# Patient Record
Sex: FEMALE | Race: WHITE | NOT HISPANIC OR LATINO | Employment: FULL TIME | URBAN - METROPOLITAN AREA
[De-identification: names, ages, dates, MRNs, and addresses within clinical notes are randomized per-mention and may not be internally consistent; named-entity substitution may affect disease eponyms.]

---

## 2017-07-11 ENCOUNTER — OFFICE VISIT (OUTPATIENT)
Dept: INTERNAL MEDICINE | Facility: CLINIC | Age: 42
End: 2017-07-11

## 2017-07-11 VITALS
SYSTOLIC BLOOD PRESSURE: 122 MMHG | TEMPERATURE: 99.2 F | DIASTOLIC BLOOD PRESSURE: 70 MMHG | HEART RATE: 75 BPM | OXYGEN SATURATION: 98 % | HEIGHT: 65 IN | BODY MASS INDEX: 48.82 KG/M2 | WEIGHT: 293 LBS

## 2017-07-11 DIAGNOSIS — Z79.899 ENCOUNTER FOR LONG-TERM (CURRENT) USE OF MEDICATIONS: ICD-10-CM

## 2017-07-11 DIAGNOSIS — R10.13 EPIGASTRIC PAIN: Primary | ICD-10-CM

## 2017-07-11 DIAGNOSIS — K92.0 HEMATEMESIS WITH NAUSEA: ICD-10-CM

## 2017-07-11 LAB
ALBUMIN SERPL-MCNC: 4.2 G/DL (ref 3.2–4.8)
ALBUMIN/GLOB SERPL: 2.1 G/DL (ref 1.5–2.5)
ALP SERPL-CCNC: 84 U/L (ref 25–100)
ALT SERPL W P-5'-P-CCNC: 14 U/L (ref 7–40)
AMYLASE SERPL-CCNC: 35 U/L (ref 30–118)
ANION GAP SERPL CALCULATED.3IONS-SCNC: 0 MMOL/L (ref 3–11)
AST SERPL-CCNC: 13 U/L (ref 0–33)
BASOPHILS # BLD AUTO: 0.05 10*3/MM3 (ref 0–0.2)
BASOPHILS NFR BLD AUTO: 0.5 % (ref 0–1)
BILIRUB SERPL-MCNC: 0.7 MG/DL (ref 0.3–1.2)
BUN BLD-MCNC: 12 MG/DL (ref 9–23)
BUN/CREAT SERPL: 20 (ref 7–25)
CALCIUM SPEC-SCNC: 9.7 MG/DL (ref 8.7–10.4)
CHLORIDE SERPL-SCNC: 108 MMOL/L (ref 99–109)
CO2 SERPL-SCNC: 30 MMOL/L (ref 20–31)
CREAT BLD-MCNC: 0.6 MG/DL (ref 0.6–1.3)
DEPRECATED RDW RBC AUTO: 43.8 FL (ref 37–54)
EOSINOPHIL # BLD AUTO: 0.49 10*3/MM3 (ref 0–0.3)
EOSINOPHIL NFR BLD AUTO: 4.8 % (ref 0–3)
ERYTHROCYTE [DISTWIDTH] IN BLOOD BY AUTOMATED COUNT: 13.3 % (ref 11.3–14.5)
GFR SERPL CREATININE-BSD FRML MDRD: 111 ML/MIN/1.73
GLOBULIN UR ELPH-MCNC: 2 GM/DL
GLUCOSE BLD-MCNC: 82 MG/DL (ref 70–100)
HCT VFR BLD AUTO: 47.6 % (ref 34.5–44)
HGB BLD-MCNC: 15.2 G/DL (ref 11.5–15.5)
IMM GRANULOCYTES # BLD: 0.08 10*3/MM3 (ref 0–0.03)
IMM GRANULOCYTES NFR BLD: 0.8 % (ref 0–0.6)
LIPASE SERPL-CCNC: 31 U/L (ref 6–51)
LITHIUM SERPL-SCNC: 0.6 MMOL/L (ref 0.6–1.2)
LYMPHOCYTES # BLD AUTO: 2.02 10*3/MM3 (ref 0.6–4.8)
LYMPHOCYTES NFR BLD AUTO: 19.8 % (ref 24–44)
MCH RBC QN AUTO: 29.2 PG (ref 27–31)
MCHC RBC AUTO-ENTMCNC: 31.9 G/DL (ref 32–36)
MCV RBC AUTO: 91.5 FL (ref 80–99)
MONOCYTES # BLD AUTO: 0.5 10*3/MM3 (ref 0–1)
MONOCYTES NFR BLD AUTO: 4.9 % (ref 0–12)
NEUTROPHILS # BLD AUTO: 7.08 10*3/MM3 (ref 1.5–8.3)
NEUTROPHILS NFR BLD AUTO: 69.2 % (ref 41–71)
PLATELET # BLD AUTO: 228 10*3/MM3 (ref 150–450)
PMV BLD AUTO: 11.6 FL (ref 6–12)
POTASSIUM BLD-SCNC: 4.7 MMOL/L (ref 3.5–5.5)
PROT SERPL-MCNC: 6.2 G/DL (ref 5.7–8.2)
RBC # BLD AUTO: 5.2 10*6/MM3 (ref 3.89–5.14)
SODIUM BLD-SCNC: 138 MMOL/L (ref 132–146)
WBC NRBC COR # BLD: 10.22 10*3/MM3 (ref 3.5–10.8)

## 2017-07-11 PROCEDURE — 83013 H PYLORI (C-13) BREATH: CPT | Performed by: NURSE PRACTITIONER

## 2017-07-11 PROCEDURE — 80053 COMPREHEN METABOLIC PANEL: CPT | Performed by: NURSE PRACTITIONER

## 2017-07-11 PROCEDURE — 82150 ASSAY OF AMYLASE: CPT | Performed by: NURSE PRACTITIONER

## 2017-07-11 PROCEDURE — 99214 OFFICE O/P EST MOD 30 MIN: CPT | Performed by: NURSE PRACTITIONER

## 2017-07-11 PROCEDURE — 80178 ASSAY OF LITHIUM: CPT | Performed by: NURSE PRACTITIONER

## 2017-07-11 PROCEDURE — 83690 ASSAY OF LIPASE: CPT | Performed by: NURSE PRACTITIONER

## 2017-07-11 PROCEDURE — 85025 COMPLETE CBC W/AUTO DIFF WBC: CPT | Performed by: NURSE PRACTITIONER

## 2017-07-11 RX ORDER — OMEPRAZOLE 20 MG/1
20 CAPSULE, DELAYED RELEASE ORAL 2 TIMES DAILY
Qty: 60 CAPSULE | Refills: 2 | Status: SHIPPED | OUTPATIENT
Start: 2017-07-11 | End: 2017-07-14 | Stop reason: SDUPTHER

## 2017-07-11 RX ORDER — ONDANSETRON 4 MG/1
4 TABLET, FILM COATED ORAL EVERY 8 HOURS PRN
Qty: 60 TABLET | Refills: 0 | Status: SHIPPED | OUTPATIENT
Start: 2017-07-11 | End: 2017-08-10

## 2017-07-11 NOTE — PROGRESS NOTES
Subjective   Raine Wang is a 41 y.o. female    Chief Complaint   Patient presents with   • Abdominal Pain     Has been going on for about 2 months.   • Nausea   • Diarrhea   • Vomiting     Abdominal Pain   This is a recurrent problem. Episode onset: 2 months. The onset quality is undetermined. The problem occurs intermittently. The problem has been waxing and waning. The pain is located in the epigastric region. The pain is moderate. The quality of the pain is aching and cramping. The abdominal pain does not radiate. Associated symptoms include diarrhea, nausea and vomiting. Pertinent negatives include no arthralgias, belching, constipation, dysuria, fever, flatus, frequency, headaches, hematochezia, hematuria, melena, myalgias or weight loss. The pain is aggravated by eating (stress). The pain is relieved by nothing. Treatments tried: biaxin, prevacid and protonix. The treatment provided mild relief. There is no history of abdominal surgery, colon cancer, Crohn's disease, gallstones, GERD, irritable bowel syndrome, pancreatitis, PUD or ulcerative colitis.   Nausea   Associated symptoms include abdominal pain (epigastric), nausea and vomiting. Pertinent negatives include no arthralgias, chest pain, chills, coughing, fatigue, fever, headaches or myalgias.   Diarrhea    Associated symptoms include abdominal pain (epigastric) and vomiting. Pertinent negatives include no arthralgias, chills, coughing, fever, headaches, increased  flatus, myalgias or weight loss. There is no history of irritable bowel syndrome.   Vomiting    Associated symptoms include abdominal pain (epigastric) and diarrhea. Pertinent negatives include no arthralgias, chest pain, chills, coughing, dizziness, fever, headaches, myalgias or weight loss. Associated symptoms comments: Blood-tinged.      Was seen in the UC about 6 weeks ago and treated with Protonix.  Sx's did not improve, so she went back.  They tried to send her to the ER, but she  did not go.  She had a friend send her in Biaxin and Prevacid due to concerns of H. Pylori.  Sx's slightly improved, but quickly returned.  She has had a few bouts of blood-tinged emesis.    She requests a lithium level to be drawn today with labs.    The following portions of the patient's history were reviewed and updated as appropriate: allergies, current medications, past family history, past medical history, past social history, past surgical history and problem list.    Current Outpatient Prescriptions:   •  albuterol (PROVENTIL HFA;VENTOLIN HFA) 108 (90 BASE) MCG/ACT inhaler, Inhale 2 puffs every 4 (four) hours as needed for wheezing., Disp: 1 inhaler, Rfl: 11  •  benzonatate (TESSALON) 200 MG capsule, Take 1 capsule by mouth 3 (Three) Times a Day As Needed for cough for up to 15 doses., Disp: 15 capsule, Rfl: 0  •  fexofenadine (ALLEGRA) 180 MG tablet, Take 180 mg by mouth daily., Disp: , Rfl:   •  HYDROcod Polst-CPM Polst ER (TUSSIONEX PENNKINETIC ER) 10-8 MG/5ML ER suspension, Take 5 mL by mouth Every 12 (Twelve) Hours As Needed for cough., Disp: 115 mL, Rfl: 0  •  lamoTRIgine (LaMICtal) 100 MG tablet, Take 200 mg by mouth daily., Disp: , Rfl:   •  lithium carbonate 300 MG capsule, Take 300 mg by mouth daily., Disp: , Rfl:   •  omeprazole (priLOSEC) 20 MG capsule, Take 1 capsule by mouth 2 (Two) Times a Day., Disp: 60 capsule, Rfl: 2  •  ondansetron (ZOFRAN) 4 MG tablet, Take 1 tablet by mouth Every 8 (Eight) Hours As Needed for Nausea or Vomiting., Disp: 60 tablet, Rfl: 0  •  pseudoephedrine (SUDAFED) 120 MG 12 hr tablet, Take 1 tablet by mouth every 12 (twelve) hours., Disp: 60 tablet, Rfl: 5     Review of Systems   Constitutional: Negative for chills, fatigue, fever and weight loss.   Respiratory: Negative for cough, chest tightness and shortness of breath.    Cardiovascular: Negative for chest pain.   Gastrointestinal: Positive for abdominal pain (epigastric), diarrhea, nausea and vomiting. Negative  "for abdominal distention, anal bleeding, blood in stool, constipation, flatus, hematochezia, melena and rectal pain.   Endocrine: Negative for cold intolerance and heat intolerance.   Genitourinary: Negative for dysuria, frequency and hematuria.   Musculoskeletal: Negative for arthralgias and myalgias.   Neurological: Negative for dizziness and headaches.       Objective   Physical Exam   Constitutional: She is oriented to person, place, and time. She appears well-developed and well-nourished.   HENT:   Head: Normocephalic and atraumatic.   Eyes: Conjunctivae and EOM are normal. Pupils are equal, round, and reactive to light.   Neck: Normal range of motion.   Cardiovascular: Normal rate, regular rhythm and normal heart sounds.    Pulmonary/Chest: Effort normal and breath sounds normal.   Abdominal: Soft. Bowel sounds are normal. She exhibits no distension and no mass. There is no tenderness. There is no rebound and no guarding. No hernia.   Musculoskeletal: Normal range of motion.   Neurological: She is alert and oriented to person, place, and time. She has normal reflexes.   Skin: Skin is warm and dry.   Psychiatric: She has a normal mood and affect. Her behavior is normal. Judgment and thought content normal.     Vitals:    07/11/17 1008   BP: 122/70   BP Location: Right arm   Pulse: 75   Temp: 99.2 °F (37.3 °C)   TempSrc: Temporal Artery    SpO2: 98%   Weight: (!) 319 lb 9.6 oz (145 kg)   Height: 65\" (165.1 cm)         Assessment/Plan   Raine was seen today for abdominal pain, nausea, diarrhea and vomiting.    Diagnoses and all orders for this visit:    Epigastric pain  -     omeprazole (priLOSEC) 20 MG capsule; Take 1 capsule by mouth 2 (Two) Times a Day.  -     ondansetron (ZOFRAN) 4 MG tablet; Take 1 tablet by mouth Every 8 (Eight) Hours As Needed for Nausea or Vomiting.  -     Amylase  -     Lipase  -     CBC & Differential  -     Comprehensive Metabolic Panel  -     H. Pylori Breath Test  -     CBC Auto " Differential    Hematemesis with nausea  -     omeprazole (priLOSEC) 20 MG capsule; Take 1 capsule by mouth 2 (Two) Times a Day.  -     ondansetron (ZOFRAN) 4 MG tablet; Take 1 tablet by mouth Every 8 (Eight) Hours As Needed for Nausea or Vomiting.  -     Amylase  -     Lipase  -     CBC & Differential  -     Comprehensive Metabolic Panel  -     H. Pylori Breath Test  -     CBC Auto Differential    Encounter for long-term (current) use of medications  -     Lithium level    We will ck labs and an H. Pylori breath test today  Omeprazole BID, Zofran PRN for nausea  [place] diet, avoid caffeine  If labs and breath test is normal, she will need a GB US and possibly a scope.   F/U in 4 weeks or sooner with any problems

## 2017-07-13 LAB — UREA BREATH TEST QL: POSITIVE

## 2017-07-14 ENCOUNTER — TELEPHONE (OUTPATIENT)
Dept: INTERNAL MEDICINE | Facility: CLINIC | Age: 42
End: 2017-07-14

## 2017-07-14 DIAGNOSIS — R10.13 EPIGASTRIC PAIN: ICD-10-CM

## 2017-07-14 DIAGNOSIS — K92.0 HEMATEMESIS WITH NAUSEA: ICD-10-CM

## 2017-07-14 RX ORDER — CLARITHROMYCIN 500 MG/1
500 TABLET, COATED ORAL 2 TIMES DAILY
Qty: 20 TABLET | Refills: 0 | Status: SHIPPED | OUTPATIENT
Start: 2017-07-14 | End: 2017-07-24

## 2017-07-14 RX ORDER — OMEPRAZOLE 20 MG/1
20 CAPSULE, DELAYED RELEASE ORAL 2 TIMES DAILY
Qty: 20 CAPSULE | Refills: 0 | Status: SHIPPED | OUTPATIENT
Start: 2017-07-14 | End: 2017-07-24

## 2017-07-14 RX ORDER — AMOXICILLIN 500 MG/1
1000 CAPSULE ORAL 2 TIMES DAILY
Qty: 20 CAPSULE | Refills: 0 | Status: SHIPPED | OUTPATIENT
Start: 2017-07-14 | End: 2017-07-24

## 2017-07-14 NOTE — TELEPHONE ENCOUNTER
----- Message from JOSEPH Teixeira sent at 7/14/2017  8:34 AM EDT -----  H. Pylori breath test was +.  I will send in meds to treat.  She will need an EGD if sx's are not improving after treatment.

## 2017-07-14 NOTE — TELEPHONE ENCOUNTER
Patient has been notified!     I have clarified the the script to the pharmacist about the amoxicillin qty which should be 40.

## 2017-07-14 NOTE — TELEPHONE ENCOUNTER
[address] called to get clarification on the rx that was sent over for this patient this morning. Thanks

## 2017-07-17 ENCOUNTER — TELEPHONE (OUTPATIENT)
Dept: INTERNAL MEDICINE | Facility: CLINIC | Age: 42
End: 2017-07-17

## 2017-07-17 NOTE — TELEPHONE ENCOUNTER
PT DOES NOT HAVE A RASH BUT ITCHY ALL OVER WENDY PRESCRIBED 2 ANTIBIOTICS SHE HAS TAKEN BOTH SEPERATLY BUT NOT TOGETHER PLEASE GIVE HER A CALL.

## 2017-07-17 NOTE — TELEPHONE ENCOUNTER
I spoke with Salma and let her know that she can try taking benadryl to see if this will help with her itching. She said she took some last night and it does help with the itching. I told her that She can continue with the benadryl as directed if it is helping, but to call us and let us know if she starts to develop any type of rash or it starts to get worse. I told her that Dr. Blanton would then change her to something else. She said she will continue with everything right now since the benadryl does help!

## 2017-08-08 ENCOUNTER — OFFICE VISIT (OUTPATIENT)
Dept: INTERNAL MEDICINE | Facility: CLINIC | Age: 42
End: 2017-08-08

## 2017-08-08 VITALS
WEIGHT: 293 LBS | OXYGEN SATURATION: 97 % | BODY MASS INDEX: 48.82 KG/M2 | HEART RATE: 86 BPM | SYSTOLIC BLOOD PRESSURE: 118 MMHG | RESPIRATION RATE: 20 BRPM | DIASTOLIC BLOOD PRESSURE: 68 MMHG | TEMPERATURE: 97.6 F | HEIGHT: 65 IN
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
## 2017-08-08 DIAGNOSIS — S09.90XA HEAD INJURY, INITIAL ENCOUNTER: ICD-10-CM

## 2017-08-08 DIAGNOSIS — M54.2 NECK PAIN, ACUTE: ICD-10-CM

## 2017-08-08 PROCEDURE — 99213 OFFICE O/P EST LOW 20 MIN: CPT | Performed by: NURSE PRACTITIONER

## 2017-08-08 NOTE — PROGRESS NOTES
Subjective   Raine Wang is a 41 y.o. female.   Chief Complaint   Patient presents with   • Head Injury     Fell yesterday at the [employer] fell straight back walking on a wooden ramp.  Hit back part of her head and blacked out for a few seconds.  When she came to herself again she was dizzy and eyes was out of focus.  Was seeing light flashes for the first 10 minutes.  Today she has been disoriented, trying to gather her thoughts and really has to think about what she is going to say.  Still really sore.  No knot but her neck is really sore and the back of her head and her left eye keeps twitching.         Head Injury    The incident occurred 12 to 24 hours ago (She slipped on wet ramp at [employer] and hit the back of her head on the wooden floor.). The injury mechanism was a fall. She lost consciousness for a period of less than one minute (10 seconds). There was no blood loss. The pain is moderate. The pain has been fluctuating since the injury. Associated symptoms include blurred vision, disorientation, headaches and memory loss (having trouble remembering names today.). Pertinent negatives include no numbness, tinnitus, vomiting or weakness. Associated symptoms comments: She lost conciousness for about 10 seconds, vision was fuzzy, saw light flashes for 10 minutes, and then was able to finish the [email or web address] tour and drive back home.  When she got home, she felt really bad with a headache and her left eye began twitching.  Today she still feeling fuzzy headed, feels disoriented, and is having to think before speaking and trouble finding her words. She denies numbness tingling, extremity weakness. She is having some nausea and is also having a lot on left sided neck pain since the fall.  She took tylenol and ibuprofen with mild relief. Neck feels tight and is spasming. . She has tried NSAIDs and acetaminophen for the symptoms. The treatment provided mild relief.              The following portions of the  patient's history were reviewed and updated as appropriate: allergies, current medications, past family history, past medical history, past social history, past surgical history and problem list.    Review of Systems   Constitutional: Negative for activity change, appetite change, chills, fatigue and fever.   HENT: Negative for postnasal drip, rhinorrhea and tinnitus.    Eyes: Positive for blurred vision and visual disturbance. Negative for photophobia, pain and redness.   Respiratory: Negative for cough and shortness of breath.    Cardiovascular: Negative for chest pain.   Gastrointestinal: Positive for nausea. Negative for vomiting.   Musculoskeletal: Positive for neck pain and neck stiffness. Negative for back pain.   Skin: Negative for rash and wound.   Neurological: Positive for dizziness and headaches. Negative for tremors, seizures, syncope, speech difficulty, weakness and numbness.   Hematological: Does not bruise/bleed easily.   Psychiatric/Behavioral: Positive for decreased concentration and memory loss (having trouble remembering names today.). Negative for agitation, behavioral problems and sleep disturbance. The patient is not nervous/anxious.        Objective   Physical Exam   Constitutional: She appears well-developed and well-nourished.   HENT:   Head: Normocephalic.   Eyes: EOM are normal. Pupils are equal, round, and reactive to light. Right conjunctiva has no hemorrhage. Left conjunctiva has no hemorrhage.   Fundoscopic exam:       The right eye shows no hemorrhage and no papilledema. The right eye shows red reflex.        The left eye shows no hemorrhage and no papilledema. The left eye shows red reflex.   Neck: Normal range of motion. Muscular tenderness present. No spinous process tenderness present. Normal range of motion (pain with full ROM) present.   Cardiovascular: Normal rate, regular rhythm and normal heart sounds.    Pulmonary/Chest: Effort normal and breath sounds normal. No  respiratory distress.   Abdominal: Soft. Bowel sounds are normal. She exhibits no distension.   Neurological: She is alert. She has normal strength. No cranial nerve deficit or sensory deficit.   Skin: Skin is warm, dry and intact.   Nursing note and vitals reviewed.      Assessment/Plan      Raine was seen today for head injury.    Diagnoses and all orders for this visit:    Fall (on)(from) incline, initial encounter  -     CT Head Without Contrast; Future  -     XR spine cervical 2 or 3 vw; Future    Head injury, initial encounter    Neck pain, acute      Suspect this is a  Concussion, but cannot rule out mild hemorrhage, vs small subdural hematoma at this time based on her sx's.  She refuses to go to the ER.  Discussed possible diagnoses and risks of going undetected, that her insurance requires preauth and a wait time for imaging, but she still refused to go to the ER. Will go ahead and order imaging for ASAP.  She will have family and friends monitor he thoughout the night and if her symptoms worsen, she will go to the ER at that time.  Will have her increase to ES tylenol Q6 hrs PRN pain.  Avoid stronger pain medications or muscle relaxants for now due to AE of sedation.  RTC in 2 days or to the ER with worsening of sx's  Plan of care discussed with pt. They verbalized understanding and agreement.

## 2017-08-09 DIAGNOSIS — M54.2 NECK PAIN: Primary | ICD-10-CM

## 2017-08-09 RX ORDER — MELOXICAM 7.5 MG/1
7.5 TABLET ORAL DAILY
Qty: 14 TABLET | Refills: 0 | Status: SHIPPED | OUTPATIENT
Start: 2017-08-09 | End: 2017-08-14

## 2017-08-09 NOTE — TELEPHONE ENCOUNTER
I called Raine to check on her today.  Her disorientation, fuzzy vision, and eye twitching have improved.  She is still having some pain in her neck, although the heating pad helped a lot.  I called in Mobic for her today.

## 2017-08-10 ENCOUNTER — OFFICE VISIT (OUTPATIENT)
Dept: INTERNAL MEDICINE | Facility: CLINIC | Age: 42
End: 2017-08-10

## 2017-08-10 VITALS
SYSTOLIC BLOOD PRESSURE: 118 MMHG | TEMPERATURE: 97.3 F | BODY MASS INDEX: 48.82 KG/M2 | DIASTOLIC BLOOD PRESSURE: 70 MMHG | WEIGHT: 293 LBS | HEIGHT: 65 IN

## 2017-08-10 DIAGNOSIS — M62.838 NECK MUSCLE SPASM: Primary | ICD-10-CM

## 2017-08-10 PROCEDURE — 99213 OFFICE O/P EST LOW 20 MIN: CPT | Performed by: NURSE PRACTITIONER

## 2017-08-10 RX ORDER — CYCLOBENZAPRINE HCL 5 MG
5 TABLET ORAL 3 TIMES DAILY PRN
Qty: 30 TABLET | Refills: 0 | Status: SHIPPED | OUTPATIENT
Start: 2017-08-10 | End: 2017-12-22

## 2017-08-10 NOTE — PROGRESS NOTES
Subjective   Raine Wang is a 41 y.o. female.   Chief Complaint   Patient presents with   • 2 day follow up       History of Present Illness   Raine is here for a 2 day follow up after a fall 3 days ago where she hit her head after a fall at the [employer] where she had lost consciousness for 10 seconds.  She refused to go to the ER to have a CT scan done or get cervical x rays.   She was having some blurred vision, disorientation, nausea, headaches, feeling fuzzy headed, having trouble remembering names, and having to think before speaking with trouble finding her words  This has all resolved since her last visit. She denies numbness, tinnitus, vomiting or weakness, tingling, extremity weakness. Today she is feeling a lot better except mild left sided muscular neck pain since the fall is still present and she has some spasm in her left neck/supspraspinous region.  Started taking the mobic yesterday which seems to help some.       The following portions of the patient's history were reviewed and updated as appropriate: allergies, current medications, past family history, past medical history, past social history, past surgical history and problem list.    Review of Systems   Constitutional: Negative for activity change, appetite change, chills, fatigue and fever.   HENT: Negative for postnasal drip, rhinorrhea and tinnitus.    Eyes: Negative for photophobia, pain, redness and visual disturbance.   Respiratory: Negative for cough and shortness of breath.    Cardiovascular: Negative for chest pain.   Gastrointestinal: Negative for nausea and vomiting.   Musculoskeletal: Positive for neck pain. Negative for back pain and neck stiffness.        Neck spasm   Skin: Negative for rash and wound.   Neurological: Negative for dizziness, tremors, seizures, syncope, speech difficulty, weakness, numbness and headaches.   Hematological: Does not bruise/bleed easily.   Psychiatric/Behavioral: Negative for agitation,  behavioral problems, decreased concentration and sleep disturbance. The patient is not nervous/anxious.        Objective   Physical Exam   Constitutional: She appears well-developed and well-nourished.   HENT:   Head: Normocephalic.   Eyes: EOM are normal. Pupils are equal, round, and reactive to light. Right conjunctiva has no hemorrhage. Left conjunctiva has no hemorrhage.   Fundoscopic exam:       The right eye shows no hemorrhage and no papilledema. The right eye shows red reflex.        The left eye shows no hemorrhage and no papilledema. The left eye shows red reflex.   Neck: Normal range of motion. Muscular tenderness present. No spinous process tenderness present. Normal range of motion present.   Cardiovascular: Normal rate, regular rhythm and normal heart sounds.    Pulmonary/Chest: Effort normal and breath sounds normal. No respiratory distress.   Abdominal: Soft. Bowel sounds are normal. She exhibits no distension.   Musculoskeletal:        Left shoulder: Normal.        Cervical back: Normal.   Neurological: She is alert. She has normal strength. No cranial nerve deficit or sensory deficit.   Skin: Skin is warm, dry and intact.   Nursing note and vitals reviewed.      Assessment/Plan      Raine was seen today for 2 day follow up.    Diagnoses and all orders for this visit:    Neck muscle spasm  -     cyclobenzaprine (FLEXERIL) 5 MG tablet; Take 1 tablet by mouth 3 (Three) Times a Day As Needed for Muscle Spasms.        Will have her continue the mobic and add flexeril low dose.  Adverse effects discussed.  RTC PRN or with worsening of sx's  Plan of care discussed with pt. She verbalized understanding and agreement.

## 2017-08-14 ENCOUNTER — OFFICE VISIT (OUTPATIENT)
Dept: INTERNAL MEDICINE | Facility: CLINIC | Age: 42
End: 2017-08-14

## 2017-08-14 VITALS
TEMPERATURE: 98.7 F | BODY MASS INDEX: 48.82 KG/M2 | WEIGHT: 293 LBS | DIASTOLIC BLOOD PRESSURE: 80 MMHG | SYSTOLIC BLOOD PRESSURE: 118 MMHG | HEIGHT: 65 IN

## 2017-08-14 DIAGNOSIS — A04.8 H. PYLORI INFECTION: ICD-10-CM

## 2017-08-14 DIAGNOSIS — R10.13 EPIGASTRIC PAIN: Primary | ICD-10-CM

## 2017-08-14 DIAGNOSIS — J30.2 SEASONAL ALLERGIC RHINITIS, UNSPECIFIED ALLERGIC RHINITIS TRIGGER: ICD-10-CM

## 2017-08-14 PROCEDURE — 99214 OFFICE O/P EST MOD 30 MIN: CPT | Performed by: INTERNAL MEDICINE

## 2017-08-14 RX ORDER — LANSOPRAZOLE 30 MG/1
30 CAPSULE, DELAYED RELEASE ORAL DAILY
Qty: 30 CAPSULE | Refills: 5 | Status: SHIPPED | OUTPATIENT
Start: 2017-08-14 | End: 2018-03-06

## 2017-08-14 RX ORDER — ALPRAZOLAM 1 MG/1
1 TABLET ORAL 2 TIMES DAILY PRN
COMMUNITY
End: 2021-03-16 | Stop reason: ALTCHOICE

## 2017-08-14 RX ORDER — FLUTICASONE PROPIONATE 50 MCG
2 SPRAY, SUSPENSION (ML) NASAL DAILY
Qty: 1 EACH | Refills: 11 | Status: SHIPPED | OUTPATIENT
Start: 2017-08-14 | End: 2017-09-13

## 2017-08-14 NOTE — PROGRESS NOTES
"Subjective   Raine Wang is a 41 y.o. female.     History of Present Illness   she saw Yoko last month w/ epigastric pain, nausea, diarrhea. she  had some blood-tinged emesis. she was prescribed omeprazole 20 and Zofran. H pylori breath test was positive. She finished the prevpack  a few weeks and she is not taking the prevacid right now.   She still feels a lot of pain in epigastric area. Tends to feel worse after she eats carbs and w/ beef, but hasn't noticed any other foods that make it worse. She is generally just eating rice and salads. Has cut out carbonated drinks for the most part and caffeine as well. Does feel stress makes it worse. She takes pepto sometimes and helps a little.   Has never had EGD  She took mobic for a few days after she hit her head, but it did bother her stomach more so stopped and is on just tylenol. She does drink ETOH but has stopped over last month since the stomach pain has worsened.  She has had a lot of stress recently but now is worring at [employer] and really likes her job  She is working at   The following portions of the patient's history were reviewed and updated as appropriate: allergies, current medications, past family history, past medical history, past social history, past surgical history and problem list.    Review of Systems   Constitutional: Positive for fever. Fatigue: feels feverish but no temperatur.   HENT: Negative for congestion, ear pain and sore throat.    Respiratory: Negative for cough and wheezing.    Cardiovascular: Negative for chest pain.   Gastrointestinal: Positive for abdominal pain, diarrhea, nausea and vomiting.   Genitourinary: Negative for dysuria.   Skin: Negative for rash.   Allergic/Immunologic: Positive for environmental allergies (has been using flonase and allegra prn).   Neurological: Negative for headaches.       Objective   Blood pressure 118/80, temperature 98.7 °F (37.1 °C), temperature source Temporal Artery , height 65\" (165.1 " cm), weight (!) 319 lb 12.8 oz (145 kg).  Physical Exam   Constitutional: She is oriented to person, place, and time. She appears well-developed and well-nourished. No distress.   HENT:   Head: Normocephalic and atraumatic.   Eyes: Conjunctivae and EOM are normal.   Cardiovascular: Normal rate, regular rhythm and normal heart sounds.  Exam reveals no gallop and no friction rub.    No murmur heard.  Pulmonary/Chest: Effort normal and breath sounds normal. No respiratory distress. She has no wheezes.   Abdominal: Soft. Bowel sounds are normal. There is tenderness (epigastric tenderness).   Neurological: She is alert and oriented to person, place, and time.   Skin: Skin is warm and dry. She is not diaphoretic.   Psychiatric: She has a normal mood and affect. Her behavior is normal. Judgment and thought content normal.       Assessment/Plan   Raine was seen today for stomach issues and blood work.    Diagnoses and all orders for this visit:    Epigastric pain - will go ahead and refer for EGD since she still has a lot of pain even after finishing the hpylo treamtent. Won't retest today since she will have EGD. Willget her back on daily PPI as well. Continue to avoid NSAIDS and ETOH  -     Ambulatory referral for Screening EGD  -     lansoprazole (PREVACID) 30 MG capsule; Take 1 capsule by mouth Daily.    H. pylori infection  -     Ambulatory referral for Screening EGD    Seasonal allergic rhinitis, unspecified allergic rhinitis trigger  -     fluticasone (FLONASE) 50 MCG/ACT nasal spray; 2 sprays into each nostril Daily for 30 days.

## 2017-08-15 DIAGNOSIS — A04.8 BACTERIAL INFECTION DUE TO HELICOBACTER PYLORI: ICD-10-CM

## 2017-08-15 DIAGNOSIS — R10.13 ABDOMINAL PAIN, EPIGASTRIC: Primary | ICD-10-CM

## 2017-08-15 DIAGNOSIS — R10.13 EPIGASTRIC PAIN: Primary | ICD-10-CM

## 2017-08-15 DIAGNOSIS — A04.8 H. PYLORI INFECTION: ICD-10-CM

## 2017-08-28 ENCOUNTER — HOSPITAL ENCOUNTER (OUTPATIENT)
Facility: HOSPITAL | Age: 42
Setting detail: HOSPITAL OUTPATIENT SURGERY
Discharge: HOME | End: 2017-08-28
Attending: INTERNAL MEDICINE | Admitting: INTERNAL MEDICINE

## 2017-08-28 ENCOUNTER — ANESTHESIA EVENT (OUTPATIENT)
Dept: GASTROENTEROLOGY | Facility: HOSPITAL | Age: 42
End: 2017-08-28

## 2017-08-28 ENCOUNTER — ANESTHESIA (OUTPATIENT)
Dept: GASTROENTEROLOGY | Facility: HOSPITAL | Age: 42
End: 2017-08-28

## 2017-08-28 VITALS
SYSTOLIC BLOOD PRESSURE: 122 MMHG | OXYGEN SATURATION: 99 % | DIASTOLIC BLOOD PRESSURE: 54 MMHG | TEMPERATURE: 97.5 F | RESPIRATION RATE: 16 BRPM | HEART RATE: 78 BPM

## 2017-08-28 DIAGNOSIS — R10.13 EPIGASTRIC PAIN: ICD-10-CM

## 2017-08-28 DIAGNOSIS — A04.8 H. PYLORI INFECTION: ICD-10-CM

## 2017-08-28 PROCEDURE — 99219 PR INITIAL OBSERVATION CARE/DAY 50 MINUTES: CPT | Performed by: PHYSICIAN ASSISTANT

## 2017-08-28 PROCEDURE — 25010000002 PROPOFOL 10 MG/ML EMULSION: Performed by: NURSE ANESTHETIST, CERTIFIED REGISTERED

## 2017-08-28 PROCEDURE — 88305 TISSUE EXAM BY PATHOLOGIST: CPT | Performed by: INTERNAL MEDICINE

## 2017-08-28 PROCEDURE — 88312 SPECIAL STAINS GROUP 1: CPT | Performed by: INTERNAL MEDICINE

## 2017-08-28 RX ORDER — FENTANYL CITRATE 50 UG/ML
50 INJECTION, SOLUTION INTRAMUSCULAR; INTRAVENOUS
Status: DISCONTINUED | OUTPATIENT
Start: 2017-08-28 | End: 2017-08-29 | Stop reason: HOSPADM

## 2017-08-28 RX ORDER — PROPOFOL 10 MG/ML
VIAL (ML) INTRAVENOUS AS NEEDED
Status: DISCONTINUED | OUTPATIENT
Start: 2017-08-28 | End: 2017-08-28 | Stop reason: SURG

## 2017-08-28 RX ORDER — FAMOTIDINE 20 MG/1
20 TABLET, FILM COATED ORAL ONCE
Status: CANCELLED | OUTPATIENT
Start: 2017-08-28 | End: 2017-08-28

## 2017-08-28 RX ORDER — SODIUM CHLORIDE, SODIUM LACTATE, POTASSIUM CHLORIDE, CALCIUM CHLORIDE 600; 310; 30; 20 MG/100ML; MG/100ML; MG/100ML; MG/100ML
9 INJECTION, SOLUTION INTRAVENOUS CONTINUOUS
Status: DISCONTINUED | OUTPATIENT
Start: 2017-08-28 | End: 2017-08-29 | Stop reason: HOSPADM

## 2017-08-28 RX ORDER — FAMOTIDINE 10 MG/ML
20 INJECTION, SOLUTION INTRAVENOUS ONCE
Status: DISCONTINUED | OUTPATIENT
Start: 2017-08-28 | End: 2017-08-28

## 2017-08-28 RX ORDER — LIDOCAINE HYDROCHLORIDE 10 MG/ML
INJECTION, SOLUTION EPIDURAL; INFILTRATION; INTRACAUDAL; PERINEURAL AS NEEDED
Status: DISCONTINUED | OUTPATIENT
Start: 2017-08-28 | End: 2017-08-28 | Stop reason: SURG

## 2017-08-28 RX ORDER — SODIUM CHLORIDE 0.9 % (FLUSH) 0.9 %
1-10 SYRINGE (ML) INJECTION AS NEEDED
Status: DISCONTINUED | OUTPATIENT
Start: 2017-08-28 | End: 2017-08-28 | Stop reason: HOSPADM

## 2017-08-28 RX ORDER — LIDOCAINE HYDROCHLORIDE 10 MG/ML
0.5 INJECTION, SOLUTION EPIDURAL; INFILTRATION; INTRACAUDAL; PERINEURAL ONCE AS NEEDED
Status: CANCELLED | OUTPATIENT
Start: 2017-08-28

## 2017-08-28 RX ADMIN — PROPOFOL 50 MG: 10 INJECTION, EMULSION INTRAVENOUS at 09:36

## 2017-08-28 RX ADMIN — LIDOCAINE HYDROCHLORIDE 30 MG: 10 INJECTION, SOLUTION EPIDURAL; INFILTRATION; INTRACAUDAL; PERINEURAL at 09:34

## 2017-08-28 RX ADMIN — SODIUM CHLORIDE, POTASSIUM CHLORIDE, SODIUM LACTATE AND CALCIUM CHLORIDE 9 ML/HR: 600; 310; 30; 20 INJECTION, SOLUTION INTRAVENOUS at 09:27

## 2017-08-28 RX ADMIN — PROPOFOL 50 MG: 10 INJECTION, EMULSION INTRAVENOUS at 09:38

## 2017-08-28 RX ADMIN — FAMOTIDINE 20 MG: 10 INJECTION, SOLUTION INTRAVENOUS at 09:27

## 2017-08-28 RX ADMIN — PROPOFOL 100 MG: 10 INJECTION, EMULSION INTRAVENOUS at 09:34

## 2017-08-28 NOTE — ANESTHESIA PREPROCEDURE EVALUATION
Anesthesia Evaluation     Patient summary reviewed and Nursing notes reviewed          Airway   Mallampati: II  TM distance: >3 FB  Neck ROM: full  no difficulty expected  Dental - normal exam     Pulmonary - normal exam   (+) a smoker Former, asthma,   Cardiovascular - normal exam    (+) hyperlipidemia      Neuro/Psych  (+) psychiatric history Bipolar,    GI/Hepatic/Renal/Endo - negative ROS     Musculoskeletal (-) negative ROS    Abdominal  - normal exam    Bowel sounds: normal.   Substance History - negative use     OB/GYN negative ob/gyn ROS         Other                                        Anesthesia Plan    ASA 2     general     intravenous induction   Anesthetic plan and risks discussed with patient.    Plan discussed with CRNA.

## 2017-08-28 NOTE — ANESTHESIA POSTPROCEDURE EVALUATION
Patient: Raine Wang    Procedure Summary     Date Anesthesia Start Anesthesia Stop Room / Location    08/28/17 0931 0947  [facility] /  [facility]       Procedure Diagnosis Surgeon Provider    ESOPHAGOGASTRODUODENOSCOPY (N/A Esophagus) Epigastric pain; H. pylori infection  (Epigastric pain [R10.13]; H. pylori infection [A04.8]) Mark I Brunner, MD Jeremy B. Wells, MD          Anesthesia Type: general  Last vitals  BP   111/64 (08/28/17 0920)    Temp   97.4 °F (36.3 °C) (08/28/17 0920)    Pulse   78 (08/28/17 0920)   Resp   16 (08/28/17 0920)    SpO2   95 % (08/28/17 0920)      Post Anesthesia Care and Evaluation    Patient location during evaluation: PACU  Patient participation: complete - patient participated  Level of consciousness: awake and alert  Pain score: 0  Pain management: adequate  Airway patency: patent  Anesthetic complications: No anesthetic complications  PONV Status: none  Cardiovascular status: hemodynamically stable and acceptable  Respiratory status: nonlabored ventilation, acceptable and nasal cannula  Hydration status: acceptable

## 2017-08-29 LAB
CYTO UR: NORMAL
LAB AP CASE REPORT: NORMAL
LAB AP CLINICAL INFORMATION: NORMAL
Lab: NORMAL
PATH REPORT.FINAL DX SPEC: NORMAL
PATH REPORT.GROSS SPEC: NORMAL

## 2017-09-01 ENCOUNTER — TELEPHONE (OUTPATIENT)
Dept: GASTROENTEROLOGY | Facility: CLINIC | Age: 42
End: 2017-09-01

## 2017-09-01 DIAGNOSIS — B37.81 CANDIDAL ESOPHAGITIS (HCC): Primary | ICD-10-CM

## 2017-09-01 RX ORDER — FLUCONAZOLE 100 MG/1
100 TABLET ORAL DAILY
Qty: 10 TABLET | Refills: 0 | Status: SHIPPED | OUTPATIENT
Start: 2017-09-01 | End: 2017-09-11

## 2017-09-01 NOTE — TELEPHONE ENCOUNTER
Discussed pathology results. Specifically, no evidence for celiac disease, and H. Pylori negative (recent treatment successful). Will treat candida esophagitis with Diflucan.

## 2017-09-21 ENCOUNTER — TELEPHONE (OUTPATIENT)
Dept: INTERNAL MEDICINE | Facility: CLINIC | Age: 42
End: 2017-09-21

## 2017-09-21 NOTE — TELEPHONE ENCOUNTER
Tried to call pt to see if she has had or going to get her spine xray.  Her phone # is not a working number

## 2017-10-09 RX ORDER — ALBUTEROL SULFATE 90 UG/1
AEROSOL, METERED RESPIRATORY (INHALATION)
Qty: 1 INHALER | Refills: 10 | Status: SHIPPED | OUTPATIENT
Start: 2017-10-09 | End: 2019-05-16 | Stop reason: SDUPTHER

## 2017-12-08 ENCOUNTER — TELEPHONE (OUTPATIENT)
Dept: URGENT CARE | Facility: CLINIC | Age: 42
End: 2017-12-08

## 2017-12-09 NOTE — TELEPHONE ENCOUNTER
Mrs. Wang called and was wanting to know what she should do because she does not feel any better, she stated that she was actually feeling worse. Patient stated that she is losing her voice and that it feels like it is going into her chest. Per Dr. Vee she would like for her to come back in and be seen. Patient stated that she would be back in [facility] 12/8/17.

## 2017-12-21 ENCOUNTER — TELEPHONE (OUTPATIENT)
Dept: INTERNAL MEDICINE | Facility: CLINIC | Age: 42
End: 2017-12-21

## 2017-12-21 NOTE — TELEPHONE ENCOUNTER
PT CALLED IN EXTREME KNEE PAIN WENT TO URGENT [facility]ARE ONLY GIVE TYLENOL SHE NEEDS SOMETHING STRONGER PLEASE CALL HER AND LET HER KNOW IF SOMETHING CAN BE SENT IN

## 2017-12-22 ENCOUNTER — OFFICE VISIT (OUTPATIENT)
Dept: INTERNAL MEDICINE | Facility: CLINIC | Age: 42
End: 2017-12-22

## 2017-12-22 VITALS
TEMPERATURE: 99.4 F | OXYGEN SATURATION: 98 % | HEART RATE: 108 BPM | DIASTOLIC BLOOD PRESSURE: 86 MMHG | SYSTOLIC BLOOD PRESSURE: 136 MMHG

## 2017-12-22 DIAGNOSIS — J01.00 ACUTE MAXILLARY SINUSITIS, RECURRENCE NOT SPECIFIED: ICD-10-CM

## 2017-12-22 DIAGNOSIS — M25.561 ACUTE PAIN OF RIGHT KNEE: ICD-10-CM

## 2017-12-22 DIAGNOSIS — Z20.818 EXPOSURE TO STREP THROAT: ICD-10-CM
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
## 2017-12-22 DIAGNOSIS — Z20.828 EXPOSURE TO THE FLU: Primary | ICD-10-CM

## 2017-12-22 DIAGNOSIS — R05.9 COUGH WITH FEVER: ICD-10-CM

## 2017-12-22 DIAGNOSIS — S80.01XA CONTUSION OF RIGHT KNEE, INITIAL ENCOUNTER: ICD-10-CM

## 2017-12-22 DIAGNOSIS — R50.9 COUGH WITH FEVER: ICD-10-CM
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
## 2017-12-22 DIAGNOSIS — S83.001A: ICD-10-CM

## 2017-12-22 DIAGNOSIS — R68.89 FLU-LIKE SYMPTOMS: ICD-10-CM

## 2017-12-22 LAB
EXPIRATION DATE: NORMAL
EXPIRATION DATE: NORMAL
FLUAV AG NPH QL: NORMAL
FLUBV AG NPH QL: NORMAL
INTERNAL CONTROL: NORMAL
INTERNAL CONTROL: NORMAL
Lab: NORMAL
Lab: NORMAL
S PYO AG THROAT QL: NEGATIVE

## 2017-12-22 PROCEDURE — 87880 STREP A ASSAY W/OPTIC: CPT | Performed by: FAMILY MEDICINE

## 2017-12-22 PROCEDURE — 99214 OFFICE O/P EST MOD 30 MIN: CPT | Performed by: FAMILY MEDICINE

## 2017-12-22 PROCEDURE — 87804 INFLUENZA ASSAY W/OPTIC: CPT | Performed by: FAMILY MEDICINE

## 2017-12-22 RX ORDER — OSELTAMIVIR PHOSPHATE 75 MG/1
75 CAPSULE ORAL 2 TIMES DAILY
Qty: 10 CAPSULE | Refills: 0 | Status: SHIPPED | OUTPATIENT
Start: 2017-12-22 | End: 2018-02-13

## 2017-12-22 RX ORDER — CEFUROXIME AXETIL 500 MG/1
500 TABLET ORAL 2 TIMES DAILY
Qty: 20 TABLET | Refills: 0 | Status: SHIPPED | OUTPATIENT
Start: 2017-12-22 | End: 2018-02-13

## 2017-12-22 RX ORDER — ALBUTEROL SULFATE 2.5 MG/3ML
2.5 SOLUTION RESPIRATORY (INHALATION) EVERY 4 HOURS PRN
Qty: 25 VIAL | Refills: 1 | Status: SHIPPED | OUTPATIENT
Start: 2017-12-22

## 2017-12-22 RX ORDER — CEFUROXIME AXETIL 500 MG/1
500 TABLET ORAL 2 TIMES DAILY
Qty: 20 TABLET | Refills: 0 | Status: SHIPPED | OUTPATIENT
Start: 2017-12-22 | End: 2017-12-22 | Stop reason: SDUPTHER

## 2017-12-22 RX ORDER — HYDROCODONE BITARTRATE AND ACETAMINOPHEN 5; 325 MG/1; MG/1
1 TABLET ORAL EVERY 6 HOURS PRN
Qty: 20 TABLET | Refills: 0 | Status: SHIPPED | OUTPATIENT
Start: 2017-12-22 | End: 2018-03-06

## 2017-12-22 RX ORDER — BROMPHENIRAMINE MALEATE, PSEUDOEPHEDRINE HYDROCHLORIDE, AND DEXTROMETHORPHAN HYDROBROMIDE 2; 30; 10 MG/5ML; MG/5ML; MG/5ML
5 SYRUP ORAL 4 TIMES DAILY PRN
Qty: 118 ML | Refills: 1 | Status: SHIPPED | OUTPATIENT
Start: 2017-12-22 | End: 2018-02-13

## 2017-12-22 NOTE — PROGRESS NOTES
Subjective   Raine Wang is a 42 y.o. female.     Chief Complaint   Patient presents with   • Fatigue     body aches, cough, feels feverish   • Knee Pain     fell on stairs monday night; was at urgent care. She has to get up stairs and cant use brace she has   • Sore Throat     exposed to strep       Visit Vitals   • /86   • Pulse 108   • Temp 99.4 °F (37.4 °C)   • SpO2 98%       Sore Throat    This is a new problem. The current episode started yesterday. The problem has been gradually worsening. The maximum temperature recorded prior to her arrival was 100.4 - 100.9 F. The pain is at a severity of 5/10. The pain is moderate. Associated symptoms include congestion, coughing, drooling, ear pain, headaches, a plugged ear sensation and shortness of breath. Pertinent negatives include no abdominal pain, diarrhea, ear discharge, hoarse voice, neck pain, stridor, swollen glands, trouble swallowing or vomiting. She has had exposure to strep. She has had no exposure to mono.   Fatigue   This is a new problem. The current episode started yesterday. The problem occurs constantly. The problem has been unchanged. Associated symptoms include arthralgias, chills, congestion, coughing, diaphoresis, fatigue, a fever, headaches, joint swelling, myalgias, nausea, a sore throat and weakness. Pertinent negatives include no abdominal pain, anorexia, change in bowel habit, chest pain, neck pain, numbness, rash, swollen glands, urinary symptoms, vertigo, visual change or vomiting. Nothing aggravates the symptoms. She has tried NSAIDs and acetaminophen for the symptoms. The treatment provided no relief.   Knee Pain    The incident occurred 5 to 7 days ago. The incident occurred at home. The injury mechanism was a fall. The pain is present in the right knee. The quality of the pain is described as shooting, stabbing, cramping, burning and aching. The pain is at a severity of 10/10. The pain is severe. The pain has been  fluctuating since onset. Associated symptoms include an inability to bear weight, a loss of motion and tingling. Pertinent negatives include no numbness. She reports no foreign bodies present. The symptoms are aggravated by weight bearing, palpation and movement. She has tried acetaminophen and NSAIDs for the symptoms. The treatment provided no relief.      Pt unable to weigh because of knee injury right.  Pt's brace is not working well and she needs a hinged brace.  Pt was seen at Urgent Care 12/19/17.  Pt had fallen on her right knee while climbing stairs at home.     Pt has chronic pain right knee after fall on the right knee this week.  Pt is taking 800mg ibuprofen and 2 tylenol every 4-6 hours.    Pt was exposed to strep and flu at work.   Pt has been wheezing and was tired yesterday.  PT states that last night she started coughing.  This morning she started to wheeze.    Pt recently on amoxil and prednisone and zpak from urgent care.     Pt has Ortho appt in January  The following portions of the patient's history were reviewed and updated as appropriate: allergies, current medications, past family history, past medical history, past social history, past surgical history and problem list.    Past Medical History:   Diagnosis Date   • History of placement of ear tubes    • Pap smear for cervical cancer screening 10/2011    Mini Wong at    • Patellar subluxation      Past Surgical History:   Procedure Laterality Date   • ENDOSCOPY N/A 8/28/2017    Procedure: ESOPHAGOGASTRODUODENOSCOPY;  Surgeon: Mark I Brunner, MD;  Location: [facility] ENDOSCOPY;  Service:    • OTHER SURGICAL HISTORY Left 2013     excision of large cystic mass from L temple      Allergies   Allergen Reactions   • Cymbalta [Duloxetine Hcl]      Family History   Problem Relation Age of Onset   • Other Mother      common variable immune deficiecny    • Polycythemia Mother    • Asthma Mother    • Diabetes type II Father    • Asthma Brother     • Thyroid disease Other        Social History     Social History   • Marital status: Single     Spouse name: N/A   • Number of children: N/A   • Years of education: N/A     Occupational History   • Not on file.     Social History Main Topics   • Smoking status: Former Smoker     Packs/day: 0.20     Years: 10.00     Types: Cigarettes     Start date: 1/1/1994     Quit date: 1/1/2014   • Smokeless tobacco: Never Used   • Alcohol use Yes      Comment: rare   • Drug use: No   • Sexual activity: Not on file     Other Topics Concern   • Not on file     Social History Narrative       Review of Systems   Constitutional: Positive for chills, diaphoresis, fatigue and fever.   HENT: Positive for congestion, drooling, ear pain, postnasal drip, rhinorrhea, sinus pain, sinus pressure, sneezing and sore throat. Negative for ear discharge, hoarse voice, nosebleeds and trouble swallowing.    Eyes: Negative.  Negative for redness and itching.   Respiratory: Positive for cough, shortness of breath and wheezing. Negative for stridor.    Cardiovascular: Negative.  Negative for chest pain and palpitations.   Gastrointestinal: Positive for nausea. Negative for abdominal pain, anorexia, blood in stool, change in bowel habit, constipation, diarrhea and vomiting.   Endocrine: Positive for cold intolerance. Negative for heat intolerance.   Genitourinary: Negative.  Negative for dysuria, frequency, hematuria and urgency.   Musculoskeletal: Positive for arthralgias, gait problem, joint swelling and myalgias. Negative for back pain and neck pain.        Has cane and brace   Skin: Negative.  Negative for color change and rash.   Allergic/Immunologic: Positive for environmental allergies.   Neurological: Positive for dizziness, tingling, weakness and headaches. Negative for vertigo, syncope, light-headedness and numbness.   Hematological: Negative.  Negative for adenopathy. Does not bruise/bleed easily.   Psychiatric/Behavioral: Negative.   Negative for dysphoric mood. The patient is not nervous/anxious.         Depression and anxiety stable on medication       Objective   Physical Exam   Constitutional: She is oriented to person, place, and time. She appears well-developed.   Using crutches   HENT:   Head: Normocephalic.   Right Ear: Tympanic membrane, external ear and ear canal normal.   Left Ear: External ear and ear canal normal. Tympanic membrane is not injected.   Nose: Rhinorrhea present. Right sinus exhibits maxillary sinus tenderness. Right sinus exhibits no frontal sinus tenderness. Left sinus exhibits maxillary sinus tenderness. Left sinus exhibits no frontal sinus tenderness.   Mouth/Throat: Posterior oropharyngeal erythema present. No oropharyngeal exudate or posterior oropharyngeal edema.       Eyes: Conjunctivae and EOM are normal. Pupils are equal, round, and reactive to light.   Neck: Normal range of motion. Neck supple.   Cardiovascular: Normal rate and regular rhythm.    No murmur heard.  Pulmonary/Chest: Effort normal. No respiratory distress. She has no decreased breath sounds. She has wheezes. She has rhonchi. She has rales in the right upper field. She exhibits no tenderness.   Abdominal: Soft. Bowel sounds are normal. There is no tenderness.   Musculoskeletal:        Right knee: She exhibits decreased range of motion, swelling and effusion. Tenderness found. Medial joint line and lateral joint line tenderness noted.        Legs:  Neurological: She is alert and oriented to person, place, and time.   Skin: Skin is warm and dry.   Psychiatric: She has a normal mood and affect. Her behavior is normal.   Nursing note and vitals reviewed.      Assessment/Plan   Raine was seen today for fatigue, knee pain and sore throat.    Diagnoses and all orders for this visit:    Exposure to the flu  -     POCT Influenza A/B    Exposure to strep throat  -     POCT rapid strep A    Contusion of right knee, initial encounter  -     Hinged Knee  Brace- Right    Subluxation of patella, acquired, right, initial encounter  -     Hinged Knee Brace- Right    Fall as cause of accidental injury in home as place of occurrence, initial encounter  -     Hinged Knee Brace- Right    Flu-like symptoms    Cough with fever  -     XR Chest PA & Lateral    Acute maxillary sinusitis, recurrence not specified    Acute pain of right knee    Other orders  -     Discontinue: cefuroxime (CEFTIN) 500 MG tablet; Take 1 tablet by mouth 2 (Two) Times a Day.  -     HYDROcodone-acetaminophen (NORCO) 5-325 MG per tablet; Take 1 tablet by mouth Every 6 (Six) Hours As Needed for Moderate Pain .  -     brompheniramine-pseudoephedrine-DM 30-2-10 MG/5ML syrup; Take 5 mL by mouth 4 (Four) Times a Day As Needed for Allergies.  -     cefuroxime (CEFTIN) 500 MG tablet; Take 1 tablet by mouth 2 (Two) Times a Day.  -     oseltamivir (TAMIFLU) 75 MG capsule; Take 1 capsule by mouth 2 (Two) Times a Day.  -     albuterol (PROVENTIL) (2.5 MG/3ML) 0.083% nebulizer solution; Take 2.5 mg by nebulization Every 4 (Four) Hours As Needed for Wheezing.                   Current Outpatient Prescriptions:   •  ALPRAZolam (XANAX) 1 MG tablet, Take 1 mg by mouth 2 (Two) Times a Day As Needed. 1/2 prn, Disp: , Rfl:   •  Brexpiprazole 1 MG tablet, Take  by mouth. Not sure dose, Disp: , Rfl:   •  fexofenadine (ALLEGRA) 180 MG tablet, Take 180 mg by mouth daily., Disp: , Rfl:   •  VENTOLIN  (90 Base) MCG/ACT inhaler, INHALE TWO PUFFS BY MOUTH EVERY 4 HOURS AS NEEDED FOR WHEEZING, Disp: 1 inhaler, Rfl: 10  •  Vortioxetine HBr (TRINTELLIX) 10 MG tablet, Take 20 mg by mouth Daily., Disp: , Rfl:   •  albuterol (PROVENTIL) (2.5 MG/3ML) 0.083% nebulizer solution, Take 2.5 mg by nebulization Every 4 (Four) Hours As Needed for Wheezing., Disp: 25 vial, Rfl: 1  •  brompheniramine-pseudoephedrine-DM 30-2-10 MG/5ML syrup, Take 5 mL by mouth 4 (Four) Times a Day As Needed for Allergies., Disp: 118 mL, Rfl: 1  •   cefuroxime (CEFTIN) 500 MG tablet, Take 1 tablet by mouth 2 (Two) Times a Day., Disp: 20 tablet, Rfl: 0  •  HYDROcodone-acetaminophen (NORCO) 5-325 MG per tablet, Take 1 tablet by mouth Every 6 (Six) Hours As Needed for Moderate Pain ., Disp: 20 tablet, Rfl: 0  •  lansoprazole (PREVACID) 30 MG capsule, Take 1 capsule by mouth Daily. (Patient taking differently: Take 30 mg by mouth As Needed.), Disp: 30 capsule, Rfl: 5  •  oseltamivir (TAMIFLU) 75 MG capsule, Take 1 capsule by mouth 2 (Two) Times a Day., Disp: 10 capsule, Rfl: 0  •  pseudoephedrine (SUDAFED) 120 MG 12 hr tablet, Take 1 tablet by mouth every 12 (twelve) hours., Disp: 60 tablet, Rfl: 5    Return in about 2 weeks (around 1/5/2018), or if symptoms worsen or fail to improve, for Recheck.    Recent Results (from the past 168 hour(s))   POCT Influenza A/B    Collection Time: 12/22/17  1:31 PM   Result Value Ref Range    Rapid Influenza A Ag neg     Rapid Influenza B Ag neg     Internal Control Passed Passed    Lot Number [id number]     Expiration Date 02/20/20    POCT rapid strep A    Collection Time: 12/22/17  1:32 PM   Result Value Ref Range    Rapid Strep A Screen Negative Negative, VALID, INVALID, Not Performed    Internal Control Passed Passed    Lot Number [id number]     Expiration Date 3/31/19      Braulio report reviewed and is consistent #[id number]

## 2018-02-13 ENCOUNTER — OFFICE VISIT (OUTPATIENT)
Dept: INTERNAL MEDICINE | Facility: CLINIC | Age: 43
End: 2018-02-13

## 2018-02-13 VITALS
WEIGHT: 293 LBS | DIASTOLIC BLOOD PRESSURE: 64 MMHG | SYSTOLIC BLOOD PRESSURE: 126 MMHG | HEART RATE: 87 BPM | HEIGHT: 65 IN | BODY MASS INDEX: 48.82 KG/M2 | RESPIRATION RATE: 20 BRPM | TEMPERATURE: 97.6 F | OXYGEN SATURATION: 99 %

## 2018-02-13 DIAGNOSIS — R53.83 OTHER FATIGUE: Primary | ICD-10-CM

## 2018-02-13 LAB
25(OH)D3 SERPL-MCNC: 5.7 NG/ML
ALBUMIN SERPL-MCNC: 4 G/DL (ref 3.2–4.8)
ALBUMIN/GLOB SERPL: 2.2 G/DL (ref 1.5–2.5)
ALP SERPL-CCNC: 82 U/L (ref 25–100)
ALT SERPL W P-5'-P-CCNC: 34 U/L (ref 7–40)
ANION GAP SERPL CALCULATED.3IONS-SCNC: 6 MMOL/L (ref 3–11)
AST SERPL-CCNC: 24 U/L (ref 0–33)
BASOPHILS # BLD AUTO: 0.04 10*3/MM3 (ref 0–0.2)
BASOPHILS NFR BLD AUTO: 0.5 % (ref 0–1)
BILIRUB SERPL-MCNC: 0.8 MG/DL (ref 0.3–1.2)
BUN BLD-MCNC: 7 MG/DL (ref 9–23)
BUN/CREAT SERPL: 11.7 (ref 7–25)
CALCIUM SPEC-SCNC: 9.1 MG/DL (ref 8.7–10.4)
CHLORIDE SERPL-SCNC: 109 MMOL/L (ref 99–109)
CO2 SERPL-SCNC: 24 MMOL/L (ref 20–31)
CREAT BLD-MCNC: 0.6 MG/DL (ref 0.6–1.3)
DEPRECATED RDW RBC AUTO: 44.2 FL (ref 37–54)
EOSINOPHIL # BLD AUTO: 0.4 10*3/MM3 (ref 0–0.3)
EOSINOPHIL NFR BLD AUTO: 5.1 % (ref 0–3)
ERYTHROCYTE [DISTWIDTH] IN BLOOD BY AUTOMATED COUNT: 13.5 % (ref 11.3–14.5)
GFR SERPL CREATININE-BSD FRML MDRD: 110 ML/MIN/1.73
GLOBULIN UR ELPH-MCNC: 1.8 GM/DL
GLUCOSE BLD-MCNC: 98 MG/DL (ref 70–100)
HCT VFR BLD AUTO: 44.9 % (ref 34.5–44)
HGB BLD-MCNC: 14.6 G/DL (ref 11.5–15.5)
IMM GRANULOCYTES # BLD: 0.05 10*3/MM3 (ref 0–0.03)
IMM GRANULOCYTES NFR BLD: 0.6 % (ref 0–0.6)
LYMPHOCYTES # BLD AUTO: 2.15 10*3/MM3 (ref 0.6–4.8)
LYMPHOCYTES NFR BLD AUTO: 27.2 % (ref 24–44)
MCH RBC QN AUTO: 29.3 PG (ref 27–31)
MCHC RBC AUTO-ENTMCNC: 32.5 G/DL (ref 32–36)
MCV RBC AUTO: 90.2 FL (ref 80–99)
MONOCYTES # BLD AUTO: 0.52 10*3/MM3 (ref 0–1)
MONOCYTES NFR BLD AUTO: 6.6 % (ref 0–12)
NEUTROPHILS # BLD AUTO: 4.74 10*3/MM3 (ref 1.5–8.3)
NEUTROPHILS NFR BLD AUTO: 60 % (ref 41–71)
PLATELET # BLD AUTO: 220 10*3/MM3 (ref 150–450)
PMV BLD AUTO: 12 FL (ref 6–12)
POTASSIUM BLD-SCNC: 4.1 MMOL/L (ref 3.5–5.5)
PROT SERPL-MCNC: 5.8 G/DL (ref 5.7–8.2)
RBC # BLD AUTO: 4.98 10*6/MM3 (ref 3.89–5.14)
SODIUM BLD-SCNC: 139 MMOL/L (ref 132–146)
TSH SERPL DL<=0.05 MIU/L-ACNC: 1.21 MIU/ML (ref 0.35–5.35)
VIT B12 BLD-MCNC: 448 PG/ML (ref 211–911)
WBC NRBC COR # BLD: 7.9 10*3/MM3 (ref 3.5–10.8)

## 2018-02-13 PROCEDURE — 86664 EPSTEIN-BARR NUCLEAR ANTIGEN: CPT | Performed by: NURSE PRACTITIONER

## 2018-02-13 PROCEDURE — 82306 VITAMIN D 25 HYDROXY: CPT | Performed by: NURSE PRACTITIONER

## 2018-02-13 PROCEDURE — 99214 OFFICE O/P EST MOD 30 MIN: CPT | Performed by: NURSE PRACTITIONER

## 2018-02-13 PROCEDURE — 86663 EPSTEIN-BARR ANTIBODY: CPT | Performed by: NURSE PRACTITIONER

## 2018-02-13 PROCEDURE — 86665 EPSTEIN-BARR CAPSID VCA: CPT | Performed by: NURSE PRACTITIONER

## 2018-02-13 PROCEDURE — 82607 VITAMIN B-12: CPT | Performed by: NURSE PRACTITIONER

## 2018-02-13 PROCEDURE — 80050 GENERAL HEALTH PANEL: CPT | Performed by: NURSE PRACTITIONER

## 2018-02-13 NOTE — PROGRESS NOTES
Subjective   Raine Wang is a 42 y.o. female    Chief Complaint   Patient presents with   • Fatigue     Tired all the time   • Nasal Congestion     some drainage.      History of Present Illness     Pt states that she just does not feel well.  She was dx'd with pneumonia in December x 2 per her report.  Since then, she has been very tired and run down.  She sleeping 10-12 hours per night and does not feel rested.  Has no energy.  No cough or chest congestion.  Still has some nasal congestion, but no other URI sx's.  No CP or SOA.      She has taken proboitics to see if this would help, but it did nothing.      The following portions of the patient's history were reviewed and updated as appropriate: allergies, current medications, past family history, past medical history, past social history, past surgical history and problem list.    Current Outpatient Prescriptions:   •  albuterol (PROVENTIL) (2.5 MG/3ML) 0.083% nebulizer solution, Take 2.5 mg by nebulization Every 4 (Four) Hours As Needed for Wheezing., Disp: 25 vial, Rfl: 1  •  ALPRAZolam (XANAX) 1 MG tablet, Take 1 mg by mouth 2 (Two) Times a Day As Needed. 1/2 prn, Disp: , Rfl:   •  Brexpiprazole 1 MG tablet, Take  by mouth. Not sure dose, Disp: , Rfl:   •  fexofenadine (ALLEGRA) 180 MG tablet, Take 180 mg by mouth daily., Disp: , Rfl:   •  HYDROcodone-acetaminophen (NORCO) 5-325 MG per tablet, Take 1 tablet by mouth Every 6 (Six) Hours As Needed for Moderate Pain ., Disp: 20 tablet, Rfl: 0  •  lansoprazole (PREVACID) 30 MG capsule, Take 1 capsule by mouth Daily. (Patient taking differently: Take 30 mg by mouth As Needed.), Disp: 30 capsule, Rfl: 5  •  pseudoephedrine (SUDAFED) 120 MG 12 hr tablet, Take 1 tablet by mouth every 12 (twelve) hours., Disp: 60 tablet, Rfl: 5  •  VENTOLIN  (90 Base) MCG/ACT inhaler, INHALE TWO PUFFS BY MOUTH EVERY 4 HOURS AS NEEDED FOR WHEEZING, Disp: 1 inhaler, Rfl: 10  •  Vortioxetine HBr (TRINTELLIX) 10 MG tablet,  "Take 20 mg by mouth Daily., Disp: , Rfl:      Review of Systems   Constitutional: Positive for fatigue. Negative for chills and fever.   Respiratory: Negative for cough, chest tightness and shortness of breath.    Cardiovascular: Negative for chest pain.   Gastrointestinal: Negative for abdominal pain, diarrhea, nausea and vomiting.   Endocrine: Negative for cold intolerance and heat intolerance.   Musculoskeletal: Negative for arthralgias.   Neurological: Negative for dizziness.       Objective   Physical Exam   Constitutional: She is oriented to person, place, and time. She appears well-developed and well-nourished.   HENT:   Head: Normocephalic and atraumatic.   Eyes: Conjunctivae and EOM are normal. Pupils are equal, round, and reactive to light.   Neck: Normal range of motion.   Cardiovascular: Normal rate, regular rhythm and normal heart sounds.    Pulmonary/Chest: Effort normal and breath sounds normal.   Abdominal: Soft. Bowel sounds are normal.   Musculoskeletal: Normal range of motion.   Neurological: She is alert and oriented to person, place, and time. She has normal reflexes.   Skin: Skin is warm and dry.   Psychiatric: She has a normal mood and affect. Her behavior is normal. Judgment and thought content normal.     Vitals:    02/13/18 0948   BP: 126/64   Pulse: 87   Resp: 20   Temp: 97.6 °F (36.4 °C)   TempSrc: Temporal Artery    SpO2: 99%   Weight: (!) 152 kg (334 lb 12.8 oz)   Height: 165 cm (64.96\")         Assessment/Plan   Raine was seen today for fatigue and nasal congestion.    Diagnoses and all orders for this visit:    Other fatigue  -     CBC & Differential  -     Comprehensive Metabolic Panel  -     Vitamin D 25 Hydroxy  -     Vitamin B12  -     TSH  -     Xochitl-Barr Virus VCA Antibody Panel      We will start with labs today  Will call with results  Encouraged increasing fluids, cleaning up diet and trying to increase activity.    RTC if sx's worsen or do not improve           "

## 2018-02-14 LAB
EBV EA IGG SER-ACNC: 77.4 U/ML (ref 0–8.9)
EBV NA IGG SER IA-ACNC: 114 U/ML (ref 0–17.9)
EBV VCA IGG SER-ACNC: >600 U/ML (ref 0–17.9)
EBV VCA IGM SER-ACNC: <36 U/ML (ref 0–35.9)
INTERPRETATION: ABNORMAL

## 2018-02-15 RX ORDER — CHOLECALCIFEROL (VITAMIN D3) 1250 MCG
50000 CAPSULE ORAL
Qty: 8 CAPSULE | Refills: 0 | Status: SHIPPED | OUTPATIENT
Start: 2018-02-15 | End: 2018-04-06

## 2018-02-16 ENCOUNTER — TELEPHONE (OUTPATIENT)
Dept: INTERNAL MEDICINE | Facility: CLINIC | Age: 43
End: 2018-02-16

## 2018-02-16 NOTE — TELEPHONE ENCOUNTER
----- Message from JOSEPH Teixeira sent at 2/15/2018  8:04 PM EST -----  Please let pt know that her labs show a reactivated mono virus and very low vitamin D.  The mono is a virus and will have to run its course.  She will need to treat sx's with fluids, tylenol, ibuprofen and rest.  I will send in Rx strength D3 that she will take once a week x 8 weeks, then she will need to  OTC D3, 5000 units and take daily.  All other labs were WNL.

## 2018-03-06 ENCOUNTER — OFFICE VISIT (OUTPATIENT)
Dept: INTERNAL MEDICINE | Facility: CLINIC | Age: 43
End: 2018-03-06

## 2018-03-06 VITALS
WEIGHT: 293 LBS | HEART RATE: 87 BPM | BODY MASS INDEX: 48.82 KG/M2 | SYSTOLIC BLOOD PRESSURE: 128 MMHG | DIASTOLIC BLOOD PRESSURE: 84 MMHG | HEIGHT: 65 IN | TEMPERATURE: 97.8 F | OXYGEN SATURATION: 98 %

## 2018-03-06 DIAGNOSIS — J01.00 ACUTE NON-RECURRENT MAXILLARY SINUSITIS: Primary | ICD-10-CM

## 2018-03-06 PROCEDURE — 99213 OFFICE O/P EST LOW 20 MIN: CPT | Performed by: NURSE PRACTITIONER

## 2018-03-06 RX ORDER — AMOXICILLIN AND CLAVULANATE POTASSIUM 875; 125 MG/1; MG/1
1 TABLET, FILM COATED ORAL 2 TIMES DAILY
Qty: 20 TABLET | Refills: 0 | Status: SHIPPED | OUTPATIENT
Start: 2018-03-06 | End: 2018-04-03

## 2018-03-06 RX ORDER — METHYLPREDNISOLONE 4 MG/1
TABLET ORAL
Qty: 21 TABLET | Refills: 0 | Status: SHIPPED | OUTPATIENT
Start: 2018-03-06 | End: 2018-04-03

## 2018-03-06 NOTE — PROGRESS NOTES
Bryan Wang is a 42 y.o. female.     URI    This is a new problem. The current episode started in the past 7 days. The problem has been unchanged. The maximum temperature recorded prior to her arrival was 100.4 - 100.9 F. The fever has been present for less than 1 day. Associated symptoms include congestion, coughing, headaches and sinus pain. Pertinent negatives include no abdominal pain, chest pain, diarrhea, dysuria, ear pain, joint swelling, nausea, neck pain, plugged ear sensation, rash, rhinorrhea, sneezing, sore throat, swollen glands, vomiting or wheezing. She has tried nothing for the symptoms. The treatment provided no relief.    Reports that she had Pneumonia in Dec 2017 and improved, then had the flu and improved. She also reports mono diagnosis from about 2 weeks ago. She was feeling better but then  About 4-5 days she began having: congestion, fever, sinus drainage.     The following portions of the patient's history were reviewed and updated as appropriate: allergies, current medications, past family history, past medical history, past social history, past surgical history and problem list.    Review of Systems   Constitutional: Positive for fatigue and fever.   HENT: Positive for congestion, sinus pain and sinus pressure. Negative for ear pain, rhinorrhea, sneezing, sore throat and trouble swallowing.    Respiratory: Positive for cough. Negative for chest tightness, shortness of breath and wheezing.    Cardiovascular: Negative for chest pain.   Gastrointestinal: Negative for abdominal pain, diarrhea, nausea and vomiting.   Genitourinary: Negative for dysuria.   Musculoskeletal: Negative for neck pain.   Skin: Negative for rash.   Neurological: Positive for headaches.       Objective   Physical Exam   Constitutional: She appears well-developed and well-nourished. No distress.   HENT:   Head: Normocephalic and atraumatic.   Right Ear: Tympanic membrane and external ear normal.   Left  Ear: Tympanic membrane and external ear normal.   Nose: Mucosal edema and rhinorrhea present. Right sinus exhibits maxillary sinus tenderness. Right sinus exhibits no frontal sinus tenderness. Left sinus exhibits maxillary sinus tenderness. Left sinus exhibits no frontal sinus tenderness.   Mouth/Throat: Oropharynx is clear and moist. No oropharyngeal exudate.   Eyes: Conjunctivae are normal. Right eye exhibits no discharge. Left eye exhibits no discharge.   Neck: Normal range of motion. Neck supple.   Cardiovascular: Normal rate, regular rhythm and normal heart sounds.    Pulmonary/Chest: Effort normal and breath sounds normal. No respiratory distress.   Lymphadenopathy:     She has no cervical adenopathy.   Skin: Skin is warm and dry. She is not diaphoretic.   Nursing note and vitals reviewed.      Assessment/Plan      Raine was seen today for uri.    Diagnoses and all orders for this visit:    Acute non-recurrent maxillary sinusitis  -     amoxicillin-clavulanate (AUGMENTIN) 875-125 MG per tablet; Take 1 tablet by mouth 2 (Two) Times a Day.  -     MethylPREDNISolone (MEDROL, JESSICA,) 4 MG tablet; Take as directed on package instructions.      meds as directed.   Increase fluids and rest.   Saline rinses.   Probiotic OTC  Return if symptoms worsen or fail to improve.  Plan of care discussed with pt. They verbalized understanding and agreement.

## 2018-03-30 ENCOUNTER — TELEPHONE (OUTPATIENT)
Dept: INTERNAL MEDICINE | Facility: CLINIC | Age: 43
End: 2018-03-30

## 2018-04-03 ENCOUNTER — OFFICE VISIT (OUTPATIENT)
Dept: INTERNAL MEDICINE | Facility: CLINIC | Age: 43
End: 2018-04-03

## 2018-04-03 VITALS
WEIGHT: 293 LBS | DIASTOLIC BLOOD PRESSURE: 76 MMHG | TEMPERATURE: 96.4 F | SYSTOLIC BLOOD PRESSURE: 122 MMHG | HEIGHT: 65 IN | BODY MASS INDEX: 48.82 KG/M2

## 2018-04-03 DIAGNOSIS — J30.89 CHRONIC ALLERGIC RHINITIS DUE TO OTHER ALLERGIC TRIGGER, UNSPECIFIED SEASONALITY: ICD-10-CM

## 2018-04-03 DIAGNOSIS — R21 RASH: ICD-10-CM

## 2018-04-03 DIAGNOSIS — R53.83 FATIGUE, UNSPECIFIED TYPE: Primary | ICD-10-CM

## 2018-04-03 DIAGNOSIS — E66.01 MORBID OBESITY (HCC): ICD-10-CM

## 2018-04-03 DIAGNOSIS — G47.8 NON-RESTORATIVE SLEEP: ICD-10-CM

## 2018-04-03 PROCEDURE — 99213 OFFICE O/P EST LOW 20 MIN: CPT | Performed by: NURSE PRACTITIONER

## 2018-04-03 RX ORDER — FLUTICASONE PROPIONATE 50 MCG
2 SPRAY, SUSPENSION (ML) NASAL DAILY
Qty: 1 BOTTLE | Refills: 3 | Status: SHIPPED | OUTPATIENT
Start: 2018-04-03 | End: 2018-09-17

## 2018-04-03 RX ORDER — METHYLPREDNISOLONE 4 MG/1
TABLET ORAL
Qty: 21 TABLET | Refills: 0 | Status: SHIPPED | OUTPATIENT
Start: 2018-04-03 | End: 2018-04-16

## 2018-04-03 NOTE — PROGRESS NOTES
Subjective   Raine Wang is a 42 y.o. female.     Rash   This is a new problem. The current episode started 1 to 4 weeks ago. The problem has been rapidly improving since onset. The affected locations include the face. The rash is characterized by itchiness and redness. She was exposed to nothing. Associated symptoms include congestion, fatigue and rhinorrhea. Pertinent negatives include no anorexia, cough, diarrhea, eye pain, facial edema, fever, joint pain, nail changes, shortness of breath, sore throat or vomiting. Past treatments include anti-itch cream. The treatment provided significant relief. Her past medical history is significant for eczema. There is no history of allergies or asthma.   URI    This is a chronic problem. The current episode started more than 1 month ago. The problem has been waxing and waning. There has been no fever. Associated symptoms include congestion, a rash and rhinorrhea. Pertinent negatives include no abdominal pain, chest pain, coughing, diarrhea, dysuria, ear pain, headaches, joint pain, joint swelling, nausea, neck pain, plugged ear sensation, sinus pain, sneezing, sore throat, swollen glands, vomiting or wheezing. She has tried antihistamine and inhaler use for the symptoms. The treatment provided mild relief.   Fatigue   This is a recurrent problem. The current episode started more than 1 month ago. The problem occurs daily. The problem has been unchanged. Associated symptoms include congestion, fatigue and a rash. Pertinent negatives include no abdominal pain, anorexia, change in bowel habit, chest pain, chills, coughing, diaphoresis, fever, headaches, joint swelling, myalgias, nausea, neck pain, numbness, sore throat, swollen glands, urinary symptoms, vertigo, visual change, vomiting or weakness. Nothing aggravates the symptoms. She has tried sleep and rest for the symptoms. The treatment provided no relief.   non restorative sleep. Was recently old she has reactivated  mono but is still not feeling better.       The following portions of the patient's history were reviewed and updated as appropriate: allergies, current medications, past family history, past medical history, past social history, past surgical history and problem list.    Review of Systems   Constitutional: Positive for fatigue and unexpected weight change. Negative for chills, diaphoresis and fever.   HENT: Positive for congestion and rhinorrhea. Negative for ear pain, sinus pain, sneezing and sore throat.    Eyes: Negative for pain.   Respiratory: Negative for cough, shortness of breath and wheezing.    Cardiovascular: Negative for chest pain.   Gastrointestinal: Negative for abdominal pain, anorexia, change in bowel habit, diarrhea, nausea and vomiting.   Genitourinary: Negative for dysuria.   Musculoskeletal: Negative for joint pain, joint swelling, myalgias and neck pain.   Skin: Positive for rash. Negative for nail changes.   Neurological: Negative for vertigo, weakness, numbness and headaches.       Objective   Physical Exam   Constitutional: She is oriented to person, place, and time. She appears well-developed and well-nourished.   HENT:   Head: Normocephalic and atraumatic.   Mouth/Throat: Uvula is midline and oropharynx is clear and moist.   Mallampati class IV anatomy   Eyes: Conjunctivae and EOM are normal. Pupils are equal, round, and reactive to light.   Neck: Normal range of motion.   Cardiovascular: Normal rate, regular rhythm and normal heart sounds.    Pulmonary/Chest: Effort normal and breath sounds normal.   Abdominal: Soft. Bowel sounds are normal.   Musculoskeletal: Normal range of motion.   Neurological: She is alert and oriented to person, place, and time. She has normal reflexes.   Skin: Skin is warm and dry. Rash noted. Rash is macular. There is erythema (faint).        Psychiatric: She has a normal mood and affect. Her behavior is normal. Judgment and thought content normal.        Assessment/Plan      Raine was seen today for rash, fatigue, uri and weight gain.    Diagnoses and all orders for this visit:    Fatigue, unspecified type  -     Ambulatory Referral to Sleep Medicine    Non-restorative sleep  -     Ambulatory Referral to Sleep Medicine    Morbid obesity  -     Ambulatory Referral to Sleep Medicine    Chronic allergic rhinitis due to other allergic trigger, unspecified seasonality  -     fluticasone (FLONASE) 50 MCG/ACT nasal spray; 2 sprays into each nostril Daily.    Rash  -     MethylPREDNISolone (MEDROL, JESSICA,) 4 MG tablet; Take as directed on package instructions.      Medrol dose jessica  Add flonase   Refer to sleep medicine for possible sleep study.   No Follow-up on file.  Plan of care discussed with pt. They verbalized understanding and agreement.

## 2018-04-15 NOTE — PROGRESS NOTES
Subjective   Raine Wang is a 42 y.o. female.     History of Present Illness   Here for f/u on:    Fatigue - she was seen by Yoko Brown in 2/18 for fatigue and EBV panel was consistent w/ reactivation or past infection. Her D was low at 5. B12, tsh, cmp, cbc were unremarkable. Yoko started her on high dose D x2m, as well as daily 5000 otc. She is taking a MVI daily too. She then saw Yoko Pritchard, who also referred her for sleep clinic. She has appt in 8/18    Yoko Pritchard also treated her w/ augmentin in 3/18 for URI, and a medrol dose pack 2 weeks ago for persistent URI symptoms. She also had facial rash felt to be from eczema    She has h/o h p[ylo that was treated, but she had persistent pain so she had  EGD in 8/17 w/ Dr Brunner that showed candida esophagitis, but was neg for h pylori. She was treated w/ fluconazole    Previous tobacco - she stopped completely 4m ago    Allergies - her mother has common immune variablle immunodeficiency that was diagnosed in her 40s. She gets IgG infusions at  w/ Dr Jared Stephenson    The following portions of the patient's history were reviewed and updated as appropriate: allergies, current medications, past family history, past medical history, past social history, past surgical history and problem list.    Review of Systems  Constitutional: no fever. Fatigue; wt gain. Falls asleep easily w/o meaning to  HENT: positive for congestion, ear pain and sore throat.    Respiratory: Negative for cough and wheezing.    Cardiovascular: Negative for chest pain.   Genitourinary: Negative for dysuria.   GI: occasionally will feel epigastric pain when she is stressed; off PPI  Skin: positive for rash - better -w as on face and arms but resolving   Allergic/Immunologic: Positive for environmental allergies (has been using flonase and allegra prn). She had allergy testing about 10 yrs ago and shots were recommended but were too expensive at the time  Neurological: Negative  "for headaches    Objective   Physical Exam   Vitals:    04/16/18 1121   BP: 124/74   BP Location: Right arm   Temp: 99.2 °F (37.3 °C)   TempSrc: Temporal Artery    Weight: (!) 154 kg (340 lb 6.4 oz)   Height: 164.8 cm (64.9\")      Constitutional: She is oriented to person, place, and time. She appears well-developed and well-nourished. No distress.   HENT: neck: no adenopathy  Head: Normocephalic and atraumatic. OP: clear, no exudate  Eyes: Conjunctivae and EOM are normal.   Cardiovascular: Normal rate, regular rhythm and normal heart sounds.  Exam reveals no gallop and no friction rub.    No murmur heard.  Pulmonary/Chest: Effort normal and breath sounds normal. No respiratory distress. She has no wheezes.   Abdominal: Soft. Bowel sounds are normal. There is no tenderness. No hsm  Neurological: She is alert and oriented to person, place, and time.   Skin: Skin is warm and dry. She is not diaphoretic.   Psychiatric: She has a normal mood and affect. Her behavior is normal. Judgment and thought content normal.       Assessment/Plan   Raine was seen today for follow-up and fatigue.    Diagnoses and all orders for this visit:    Other fatigue - probably a combination of the mono, vit D deficiecny, and frequent infections. Also strong possibility of REBECCA. She has sleep clinic appt in 8/18.    Vitamin D deficiency  -     Vitamin D 25 Hydroxy    Morbid obesity - wt loss encouraged    Chronic seasonal allergic rhinitis, unspecified trigger  -     Ambulatory Referral to Allergy    Frequent infections  -     Ambulatory Referral to Allergy    Family history of common immune variable deficiency. She has had frequent infections.   -     IgG, IgA, IgM               "

## 2018-04-16 ENCOUNTER — OFFICE VISIT (OUTPATIENT)
Dept: INTERNAL MEDICINE | Facility: CLINIC | Age: 43
End: 2018-04-16

## 2018-04-16 VITALS
SYSTOLIC BLOOD PRESSURE: 124 MMHG | HEIGHT: 65 IN | WEIGHT: 293 LBS | BODY MASS INDEX: 48.82 KG/M2 | TEMPERATURE: 99.2 F | DIASTOLIC BLOOD PRESSURE: 74 MMHG

## 2018-04-16 DIAGNOSIS — Z86.19 FREQUENT INFECTIONS: ICD-10-CM

## 2018-04-16 DIAGNOSIS — Z83.2 FAMILY HISTORY OF DISEASES OF THE BLOOD AND BLOOD-FORMING ORGANS AND CERTAIN DISORDERS INVOLVING THE IMMUNE MECHANISM: ICD-10-CM

## 2018-04-16 DIAGNOSIS — E55.9 VITAMIN D DEFICIENCY: ICD-10-CM

## 2018-04-16 DIAGNOSIS — R53.83 OTHER FATIGUE: Primary | ICD-10-CM

## 2018-04-16 DIAGNOSIS — J30.2 CHRONIC SEASONAL ALLERGIC RHINITIS, UNSPECIFIED TRIGGER: ICD-10-CM

## 2018-04-16 DIAGNOSIS — E66.01 MORBID OBESITY (HCC): ICD-10-CM

## 2018-04-16 LAB — 25(OH)D3 SERPL-MCNC: 17.6 NG/ML

## 2018-04-16 PROCEDURE — 82784 ASSAY IGA/IGD/IGG/IGM EACH: CPT | Performed by: INTERNAL MEDICINE

## 2018-04-16 PROCEDURE — 36415 COLL VENOUS BLD VENIPUNCTURE: CPT | Performed by: INTERNAL MEDICINE

## 2018-04-16 PROCEDURE — 99214 OFFICE O/P EST MOD 30 MIN: CPT | Performed by: INTERNAL MEDICINE

## 2018-04-16 PROCEDURE — 82306 VITAMIN D 25 HYDROXY: CPT | Performed by: INTERNAL MEDICINE

## 2018-04-17 LAB
IGA SERPL-MCNC: 95 MG/DL (ref 87–352)
IGG SERPL-MCNC: 508 MG/DL (ref 700–1600)
IGM SERPL-MCNC: 30 MG/DL (ref 26–217)

## 2018-04-19 RX ORDER — CHOLECALCIFEROL (VITAMIN D3) 1250 MCG
50000 CAPSULE ORAL
Qty: 4 CAPSULE | Refills: 2 | Status: SHIPPED | OUTPATIENT
Start: 2018-04-19 | End: 2018-09-17

## 2018-05-08 ENCOUNTER — OFFICE VISIT (OUTPATIENT)
Dept: INTERNAL MEDICINE | Facility: CLINIC | Age: 43
End: 2018-05-08

## 2018-05-08 VITALS
HEIGHT: 65 IN | TEMPERATURE: 98.5 F | BODY MASS INDEX: 48.82 KG/M2 | OXYGEN SATURATION: 99 % | HEART RATE: 73 BPM | WEIGHT: 293 LBS | SYSTOLIC BLOOD PRESSURE: 118 MMHG | RESPIRATION RATE: 14 BRPM | DIASTOLIC BLOOD PRESSURE: 82 MMHG

## 2018-05-08 DIAGNOSIS — R06.2 WHEEZING: Primary | ICD-10-CM

## 2018-05-08 DIAGNOSIS — J40 BRONCHITIS: ICD-10-CM

## 2018-05-08 DIAGNOSIS — R05.9 COUGH: ICD-10-CM

## 2018-05-08 PROCEDURE — 99214 OFFICE O/P EST MOD 30 MIN: CPT | Performed by: NURSE PRACTITIONER

## 2018-05-08 PROCEDURE — 94640 AIRWAY INHALATION TREATMENT: CPT | Performed by: NURSE PRACTITIONER

## 2018-05-08 RX ORDER — ALBUTEROL SULFATE 2.5 MG/3ML
2.5 SOLUTION RESPIRATORY (INHALATION) ONCE
Status: COMPLETED | OUTPATIENT
Start: 2018-05-08 | End: 2018-05-08

## 2018-05-08 RX ORDER — AZITHROMYCIN 250 MG/1
TABLET, FILM COATED ORAL
Qty: 6 TABLET | Refills: 0 | Status: SHIPPED | OUTPATIENT
Start: 2018-05-08 | End: 2018-05-22

## 2018-05-08 RX ORDER — DEXTROMETHORPHAN HYDROBROMIDE AND PROMETHAZINE HYDROCHLORIDE 15; 6.25 MG/5ML; MG/5ML
5 SYRUP ORAL 4 TIMES DAILY PRN
Qty: 120 ML | Refills: 0 | Status: SHIPPED | OUTPATIENT
Start: 2018-05-08 | End: 2018-06-18

## 2018-05-08 RX ADMIN — ALBUTEROL SULFATE 2.5 MG: 2.5 SOLUTION RESPIRATORY (INHALATION) at 11:55

## 2018-05-08 NOTE — PROGRESS NOTES
Subjective   Raine Wang is a 42 y.o. female.     Wheezing    This is a new problem. The current episode started in the past 7 days. The problem occurs intermittently. The problem has been gradually worsening. Associated symptoms include coryza, coughing, a fever, shortness of breath and a sore throat. Pertinent negatives include no abdominal pain, chest pain, chills, diarrhea, ear pain, headaches, hemoptysis, neck pain, rash, rhinorrhea, sputum production, swollen glands or vomiting. The symptoms are aggravated by pollens, weather changes and URIs. She has tried beta agonist inhalers for the symptoms. The treatment provided mild relief. Her past medical history is significant for asthma. There is no history of bronchiolitis, CAD, COPD, DVT, heart failure, past MI, PE or pneumonia.   Cough   This is a new problem. The current episode started in the past 7 days. The problem has been gradually worsening. The problem occurs every few minutes. The cough is non-productive. Associated symptoms include a fever, postnasal drip, a sore throat, shortness of breath and wheezing. Pertinent negatives include no chest pain, chills, ear congestion, ear pain, headaches, heartburn, hemoptysis, myalgias, nasal congestion, rash, rhinorrhea, sweats or weight loss. The symptoms are aggravated by pollens. She has tried a beta-agonist inhaler for the symptoms. The treatment provided mild relief. Her past medical history is significant for asthma and environmental allergies. There is no history of COPD, emphysema or pneumonia.    Went a week without her allergy meds to be able to complete allergy testing yesterday.   Allergist is going to start her on singulair and symbicort.     The following portions of the patient's history were reviewed and updated as appropriate: allergies, current medications, past family history, past medical history, past social history, past surgical history and problem list.    Review of Systems    Constitutional: Positive for fever. Negative for chills and weight loss.   HENT: Positive for congestion, postnasal drip, sneezing and sore throat. Negative for ear pain, rhinorrhea, sinus pressure and voice change.    Respiratory: Positive for cough, shortness of breath and wheezing. Negative for hemoptysis, sputum production and chest tightness.    Cardiovascular: Negative for chest pain and leg swelling.   Gastrointestinal: Negative for abdominal pain, diarrhea, heartburn and vomiting.   Musculoskeletal: Negative for myalgias and neck pain.   Skin: Negative for rash.   Allergic/Immunologic: Positive for environmental allergies.   Neurological: Negative for headaches.       Objective   Physical Exam   Constitutional: She appears well-developed and well-nourished. No distress.   HENT:   Head: Normocephalic and atraumatic.   Right Ear: External ear normal.   Left Ear: External ear normal.   Nose: Nose normal.   Mouth/Throat: Oropharynx is clear and moist. No oropharyngeal exudate.   Eyes: Conjunctivae are normal. Right eye exhibits no discharge. Left eye exhibits no discharge.   Neck: Normal range of motion. Neck supple.   Cardiovascular: Normal rate, regular rhythm and normal heart sounds.    No murmur heard.  Pulmonary/Chest: Effort normal. No respiratory distress. She has wheezes (cleared after neb). She has no rales. She exhibits no tenderness.   Lymphadenopathy:     She has no cervical adenopathy.   Skin: Skin is warm and dry. She is not diaphoretic.   Nursing note and vitals reviewed.      Assessment/Plan   Raine was seen today for cough, wheezing, low grade fever and saw allergist yesterday, has been a week without allergy med.    Diagnoses and all orders for this visit:    Wheezing  -     albuterol (PROVENTIL) nebulizer solution 0.083% 2.5 mg/3mL; Take 2.5 mg by nebulization 1 (One) Time.  -     azithromycin (ZITHROMAX Z-JESSICA) 250 MG tablet; Take 2 tablets the first day, then 1 tablet daily for 4  days.    Bronchitis  -     azithromycin (ZITHROMAX Z-JESSICA) 250 MG tablet; Take 2 tablets the first day, then 1 tablet daily for 4 days.  -     promethazine-dextromethorphan (PROMETHAZINE-DM) 6.25-15 MG/5ML syrup; Take 5 mL by mouth 4 (Four) Times a Day As Needed for Cough.    Cough  -     promethazine-dextromethorphan (PROMETHAZINE-DM) 6.25-15 MG/5ML syrup; Take 5 mL by mouth 4 (Four) Times a Day As Needed for Cough.      Neb in office  Azithromycin as directed  promethazine DM if needed  AE discussed  She will resume her allergy medications and start Singulair and Symbicort per her allergist  Return if symptoms worsen or fail to improve.  Plan of care discussed with pt. They verbalized understanding and agreement.

## 2018-05-22 NOTE — PROGRESS NOTES
History of Present Illness   Here for f/u on:    DAVID - was seen yesterday in [facility] and started on Doxy, but she did not fill because she thinks it will not work. She feels like sudafed and tussionex would help. She had been on zpack prior to that a few weeks ago. She feels like that helped just a little, but still a lot of head and chest congestion. Coughing but not bringing up anything. Wheezing as well  She is using symbicort 2 bid, albuterol every few hours, and singiulair now as well, from the allergist.    She has been seeing allergist and will start allergy shots. Had some more testing regarding the Ig deficiency, and is waiting to hear back on that. She is seeing [facility].    The following portions of the patient's history were reviewed and updated as appropriate: allergies, current medications, past family history, past medical history, past social history, past surgical history and problem list.    Review of Systems   Constitutional: positive for fatigue. +fever  ENT: ST, drainage in back of throat  Respiratory: positive for shortness of breath.  + cough but not bringing up anything. + wheezing  Cardiovascular: Negative for chest pain.   All/imm: + allergies, possible immunedeficiency (being worked-up currently w/ allergist)      Objective    Vitals:    05/23/18 1141   BP: 136/84   Pulse: 80   Temp: 97.6 °F (36.4 °C)   SpO2: 98%     Physical Exam   Constitutional: oriented to person, place, and time. well-developed and well-nourished. No distress.   HENT:   Head: Normocephalic and atraumatic. tender over B frontal and maxillary sinuses.  OP: clear, no drainage seen, no exudate  Eyes: Conjunctivae and EOM are normal.   Cardiovascular: Normal rate, regular rhythm and normal heart sounds.  Exam reveals no gallop and no friction rub.    No murmur heard.  Pulmonary/Chest: Effort normal and breath sounds normal. No respiratory distress.  No rales; + exp wheezes B.   Neurological:  alert and oriented to  person, place, and time.   Skin: Skin is warm and dry. not diaphoretic.   Psychiatric: normal mood and affect. Behavior and judgement normal    Assessment/Plan   Raien was seen today for cough, sinusitis, fever and nasal drainage.    Diagnoses and all orders for this visit:    Acute non-recurrent maxillary sinusitis/Bronchitis - continue inhalers. Will start antibiotic/steroids    -     pseudoephedrine (SUDAFED) 120 MG 12 hr tablet; Take 1 tablet by mouth Every 12 (Twelve) Hours.  -     amoxicillin-clavulanate (AUGMENTIN) 875-125 MG per tablet; Take 1 tablet by mouth Every 12 (Twelve) Hours.  -     HYDROcod Polst-CPM Polst ER (TUSSIONEX PENNKINETIC ER) 10-8 MG/5ML ER suspension; Take 5 mL by mouth Every 12 (Twelve) Hours As Needed for Cough.  -     predniSONE (DELTASONE) 20 MG tablet; 3 qd x 2d, then 2qd x 2d, then 1qd x 2d then d/c

## 2018-05-23 ENCOUNTER — OFFICE VISIT (OUTPATIENT)
Dept: INTERNAL MEDICINE | Facility: CLINIC | Age: 43
End: 2018-05-23

## 2018-05-23 VITALS
DIASTOLIC BLOOD PRESSURE: 84 MMHG | OXYGEN SATURATION: 98 % | SYSTOLIC BLOOD PRESSURE: 136 MMHG | WEIGHT: 293 LBS | HEART RATE: 80 BPM | HEIGHT: 65 IN | BODY MASS INDEX: 48.82 KG/M2 | TEMPERATURE: 97.6 F

## 2018-05-23 DIAGNOSIS — J40 BRONCHITIS: ICD-10-CM

## 2018-05-23 DIAGNOSIS — J01.00 ACUTE NON-RECURRENT MAXILLARY SINUSITIS: Primary | ICD-10-CM

## 2018-05-23 PROCEDURE — 99214 OFFICE O/P EST MOD 30 MIN: CPT | Performed by: INTERNAL MEDICINE

## 2018-05-23 RX ORDER — AMOXICILLIN AND CLAVULANATE POTASSIUM 875; 125 MG/1; MG/1
1 TABLET, FILM COATED ORAL EVERY 12 HOURS SCHEDULED
Qty: 20 TABLET | Refills: 0 | Status: SHIPPED | OUTPATIENT
Start: 2018-05-23 | End: 2018-06-18

## 2018-05-23 RX ORDER — PSEUDOEPHEDRINE HCL 120 MG/1
120 TABLET, FILM COATED, EXTENDED RELEASE ORAL EVERY 12 HOURS
Qty: 60 TABLET | Refills: 5 | Status: SHIPPED | OUTPATIENT
Start: 2018-05-23 | End: 2019-05-29 | Stop reason: SDUPTHER

## 2018-05-23 RX ORDER — PREDNISONE 20 MG/1
TABLET ORAL
Qty: 12 TABLET | Refills: 0 | Status: SHIPPED | OUTPATIENT
Start: 2018-05-23 | End: 2018-06-18

## 2018-06-18 ENCOUNTER — CONSULT (OUTPATIENT)
Dept: SLEEP MEDICINE | Facility: HOSPITAL | Age: 43
End: 2018-06-18

## 2018-06-18 VITALS
SYSTOLIC BLOOD PRESSURE: 161 MMHG | BODY MASS INDEX: 48.82 KG/M2 | OXYGEN SATURATION: 96 % | HEIGHT: 65 IN | DIASTOLIC BLOOD PRESSURE: 84 MMHG | HEART RATE: 79 BPM | WEIGHT: 293 LBS

## 2018-06-18 DIAGNOSIS — G47.33 OBSTRUCTIVE SLEEP APNEA, ADULT: ICD-10-CM

## 2018-06-18 DIAGNOSIS — R06.83 SNORING: Primary | ICD-10-CM

## 2018-06-18 DIAGNOSIS — E66.01 MORBID OBESITY (HCC): ICD-10-CM

## 2018-06-18 PROCEDURE — 99204 OFFICE O/P NEW MOD 45 MIN: CPT | Performed by: INTERNAL MEDICINE

## 2018-06-18 NOTE — PATIENT INSTRUCTIONS
Sleep Apnea  Sleep apnea is a condition in which breathing pauses or becomes shallow during sleep. Episodes of sleep apnea usually last 10 seconds or longer, and they may occur as many as 20 times an hour. Sleep apnea disrupts your sleep and keeps your body from getting the rest that it needs. This condition can increase your risk of certain health problems, including:  · Heart attack.  · Stroke.  · Obesity.  · Diabetes.  · Heart failure.  · Irregular heartbeat.    There are three kinds of sleep apnea:  · Obstructive sleep apnea. This kind is caused by a blocked or collapsed airway.  · Central sleep apnea. This kind happens when the part of the brain that controls breathing does not send the correct signals to the muscles that control breathing.  · Mixed sleep apnea. This is a combination of obstructive and central sleep apnea.    What are the causes?  The most common cause of this condition is a collapsed or blocked airway. An airway can collapse or become blocked if:  · Your throat muscles are abnormally relaxed.  · Your tongue and tonsils are larger than normal.  · You are overweight.  · Your airway is smaller than normal.    What increases the risk?  This condition is more likely to develop in people who:  · Are overweight.  · Smoke.  · Have a smaller than normal airway.  · Are elderly.  · Are male.  · Drink alcohol.  · Take sedatives or tranquilizers.  · Have a family history of sleep apnea.    What are the signs or symptoms?  Symptoms of this condition include:  · Trouble staying asleep.  · Daytime sleepiness and tiredness.  · Irritability.  · Loud snoring.  · Morning headaches.  · Trouble concentrating.  · Forgetfulness.  · Decreased interest in sex.  · Unexplained sleepiness.  · Mood swings.  · Personality changes.  · Feelings of depression.  · Waking up often during the night to urinate.  · Dry mouth.  · Sore throat.    How is this diagnosed?  This condition may be diagnosed with:  · A medical history.  · A  physical exam.  · A series of tests that are done while you are sleeping (sleep study). These tests are usually done in a sleep lab, but they may also be done at home.    How is this treated?  Treatment for this condition aims to restore normal breathing and to ease symptoms during sleep. It may involve managing health issues that can affect breathing, such as high blood pressure or obesity. Treatment may include:  · Sleeping on your side.  · Using a decongestant if you have nasal congestion.  · Avoiding the use of depressants, including alcohol, sedatives, and narcotics.  · Losing weight if you are overweight.  · Making changes to your diet.  · Quitting smoking.  · Using a device to open your airway while you sleep, such as:  ? An oral appliance. This is a custom-made mouthpiece that shifts your lower jaw forward.  ? A continuous positive airway pressure (CPAP) device. This device delivers oxygen to your airway through a mask.  ? A nasal expiratory positive airway pressure (EPAP) device. This device has valves that you put into each nostril.  ? A bi-level positive airway pressure (BPAP) device. This device delivers oxygen to your airway through a mask.  · Surgery if other treatments do not work. During surgery, excess tissue is removed to create a wider airway.    It is important to get treatment for sleep apnea. Without treatment, this condition can lead to:  · High blood pressure.  · Coronary artery disease.  · (Men) An inability to achieve or maintain an erection (impotence).  · Reduced thinking abilities.    Follow these instructions at home:  · Make any lifestyle changes that your health care provider recommends.  · Eat a healthy, well-balanced diet.  · Take over-the-counter and prescription medicines only as told by your health care provider.  · Avoid using depressants, including alcohol, sedatives, and narcotics.  · Take steps to lose weight if you are overweight.  · If you were given a device to open your  airway while you sleep, use it only as told by your health care provider.  · Do not use any tobacco products, such as cigarettes, chewing tobacco, and e-cigarettes. If you need help quitting, ask your health care provider.  · Keep all follow-up visits as told by your health care provider. This is important.  Contact a health care provider if:  · The device that you received to open your airway during sleep is uncomfortable or does not seem to be working.  · Your symptoms do not improve.  · Your symptoms get worse.  Get help right away if:  · You develop chest pain.  · You develop shortness of breath.  · You develop discomfort in your back, arms, or stomach.  · You have trouble speaking.  · You have weakness on one side of your body.  · You have drooping in your face.  These symptoms may represent a serious problem that is an emergency. Do not wait to see if the symptoms will go away. Get medical help right away. Call your local emergency services (911 in the [place] Do not drive yourself to the hospital.  This information is not intended to replace advice given to you by your health care provider. Make sure you discuss any questions you have with your health care provider.  Document Released: 12/08/2003 Document Revised: 08/13/2017 Document Reviewed: 09/26/2016  Elsevier Interactive Patient Education © 2018 [employer]

## 2018-06-18 NOTE — PROGRESS NOTES
Subjective   Raine Wang is a 42 y.o. female is being seen for consultation today at the request of JOSEPH Jackson for the evaluation of nonrestorative sleep and possible sleep-disordered breathing.    History of Present Illness  Patient complains of feeling though she's not sleeping.  She says she still tired no matter how many hours she sleeps.  She awakens frequently at night she estimates 3-4 times per night.  He is falling asleep at work and complains of decreased energy.  She denies having any known snoring but has awakened with a sore throat.  She denies awakening gasping for breath.  She has a morning headache 1-2 days per week.  She denies any problems while driving.  She denies ever breaking her nose.    She denies reflux symptoms.  She denies hypnagogic hallucinations or sleep paralysis.  She denies kicking or jerking her legs at night.  She denies having chronic pain.  She says she's gained about 50 pounds in the past year.    She goes to bed between 11 PM and midnight.  She falls asleep in about 30 minutes.  She awakens 3-5 times during the night.  She thinks she gets 6-10 hours of sleep arising between 9 AM and 10 AM.  She still feels tired.  She says she doesn't sleep on back or she will awaken herself snoring.  She denies any history of hypertension diabetes coronary artery disease.  She has a history of significant environmental allergies and is on injections.  She is being evaluated for common variable immunodeficiency.  Allergies   Allergen Reactions   • Cymbalta [Duloxetine Hcl]     She has multiple environmental allergy      Current Outpatient Prescriptions:   •  albuterol (PROVENTIL) (2.5 MG/3ML) 0.083% nebulizer solution, Take 2.5 mg by nebulization Every 4 (Four) Hours As Needed for Wheezing., Disp: 25 vial, Rfl: 1  •  ALPRAZolam (XANAX) 1 MG tablet, Take 1 mg by mouth 2 (Two) Times a Day As Needed. 1/2 prn, Disp: , Rfl:   •  Brexpiprazole 1 MG tablet, Take  by mouth. Not sure  dose, Disp: , Rfl:   •  Budesonide-Formoterol Fumarate (SYMBICORT IN), Inhale., Disp: , Rfl:   •  Cholecalciferol (VITAMIN D3) 44686 units capsule, Take 1 capsule by mouth Every 7 (Seven) Days., Disp: 4 capsule, Rfl: 2  •  fexofenadine (ALLEGRA) 180 MG tablet, Take 180 mg by mouth daily., Disp: , Rfl:   •  fluticasone (FLONASE) 50 MCG/ACT nasal spray, 2 sprays into each nostril Daily., Disp: 1 bottle, Rfl: 3  •  montelukast (SINGULAIR) 10 MG tablet, Take 10 mg by mouth Every Night., Disp: , Rfl:   •  pseudoephedrine (SUDAFED) 120 MG 12 hr tablet, Take 1 tablet by mouth Every 12 (Twelve) Hours., Disp: 60 tablet, Rfl: 5  •  VENTOLIN  (90 Base) MCG/ACT inhaler, INHALE TWO PUFFS BY MOUTH EVERY 4 HOURS AS NEEDED FOR WHEEZING, Disp: 1 inhaler, Rfl: 10  •  Vortioxetine HBr (TRINTELLIX) 10 MG tablet, Take 20 mg by mouth Daily., Disp: , Rfl:     Smoking status: Former Smoker                                                              Packs/day: 0.20      Years: 10.00        Types: Cigarettes     Start date: 1/1/1994     Quit date: 1/1/2014  Smokeless tobacco: Never Used                           History   Alcohol Use   • Yes     Comment: She estimates 2 beers per week        Caffeine: She has 1 cup coffee and one serving of tea each day    Past Medical History:   Diagnosis Date   • History of placement of ear tubes    • Pap smear for cervical cancer screening 10/2011    Mini Wong at    • Patellar subluxation        Past Surgical History:   Procedure Laterality Date   • ENDOSCOPY N/A 8/28/2017    Procedure: ESOPHAGOGASTRODUODENOSCOPY;  Surgeon: Mark I Brunner, MD;  Location: [facility] ENDOSCOPY;  Service:    • OTHER SURGICAL HISTORY Left 2013     excision of large cystic mass from L temple    She's had wisdom teeth extraction    Family History   Problem Relation Age of Onset   • Other Mother         common variable immune deficiecny    • Polycythemia Mother    • Asthma Mother    • Diabetes type II Father    •  "Asthma Brother    • Thyroid disease Other        The following portions of the patient's history were reviewed and updated as appropriate: allergies, current medications, past family history, past medical history, past social history, past surgical history and problem list.    Review of Systems   Constitutional: Positive for fatigue and unexpected weight change.   HENT: Positive for sinus pressure.    Eyes: Negative.    Respiratory: Negative.    Cardiovascular: Negative.    Gastrointestinal: Negative.    Endocrine: Negative.    Genitourinary: Negative.    Musculoskeletal: Negative.    Skin: Positive for rash.   Allergic/Immunologic: Positive for environmental allergies.   Neurological: Positive for numbness.   Hematological: Negative.    Psychiatric/Behavioral: Positive for dysphoric mood. The patient is nervous/anxious.     Cross scores 10/24    Objective     /84   Pulse 79   Ht 165.1 cm (65\")   Wt (!) 158 kg (347 lb 12.8 oz)   SpO2 96%   BMI 57.88 kg/m²      Physical Exam   Constitutional: She is oriented to person, place, and time. She appears well-developed and well-nourished.   She is morbidly obese.   HENT:   Head: Normocephalic and atraumatic.   She has nasal airway narrowing with Mallampati class III anatomy and tonsillar hypertrophy grade 3   Eyes: EOM are normal. Pupils are equal, round, and reactive to light.   Neck: Normal range of motion. Neck supple.   Cardiovascular: Normal rate, regular rhythm and normal heart sounds.    Pulmonary/Chest: Effort normal and breath sounds normal.   Abdominal: Soft. Bowel sounds are normal.   Musculoskeletal: Normal range of motion. She exhibits edema.   He has trace pedal edema   Neurological: She is alert and oriented to person, place, and time.   Skin: Skin is warm and dry.   Psychiatric: She has a normal mood and affect. Her behavior is normal.         Assessment/Plan   Raine was seen today for sleeping problem.    Diagnoses and all orders for this " visit:    Snoring  -     Home Sleep Study; Future    Obstructive sleep apnea, adult  -     Home Sleep Study; Future    Morbid obesity     patient is a history of occasionally snoring but has very nonrestorative sleep.  I think she gives a good history for obstructive sleep apnea.  We will plan to proceed to home sleep testing.  I've discussed possible therapies including CPAP, weight control, oral appliances, and surgery.  Also discussed possible complications of long-term untreated obstructive sleep apnea.  She is to return then after her study.  She is encouraged to lose weight.  She is encouraged to avoid alcohol and sedatives close to bedtime.  She is encouraged practice lateral position sleep.         Jared Abreu MD [facility]  Sleep Medicine  Pulmonary and Critical Care Medicine

## 2018-06-26 ENCOUNTER — HOSPITAL ENCOUNTER (OUTPATIENT)
Dept: SLEEP MEDICINE | Facility: HOSPITAL | Age: 43
End: 2018-06-26
Attending: INTERNAL MEDICINE

## 2018-06-26 VITALS
OXYGEN SATURATION: 97 % | HEART RATE: 70 BPM | SYSTOLIC BLOOD PRESSURE: 146 MMHG | WEIGHT: 293 LBS | BODY MASS INDEX: 48.82 KG/M2 | DIASTOLIC BLOOD PRESSURE: 112 MMHG | HEIGHT: 65 IN

## 2018-06-26 DIAGNOSIS — R06.83 SNORING: ICD-10-CM

## 2018-06-26 DIAGNOSIS — G47.33 OBSTRUCTIVE SLEEP APNEA, ADULT: ICD-10-CM

## 2018-06-26 PROCEDURE — 95806 SLEEP STUDY UNATT&RESP EFFT: CPT

## 2018-06-26 PROCEDURE — 95806 SLEEP STUDY UNATT&RESP EFFT: CPT | Performed by: INTERNAL MEDICINE

## 2018-06-27 DIAGNOSIS — R06.83 SNORING: ICD-10-CM

## 2018-06-27 DIAGNOSIS — R29.818 SUSPECTED SLEEP APNEA: Primary | ICD-10-CM

## 2018-07-18 ENCOUNTER — APPOINTMENT (OUTPATIENT)
Dept: SLEEP MEDICINE | Facility: HOSPITAL | Age: 43
End: 2018-07-18
Attending: INTERNAL MEDICINE

## 2018-09-16 NOTE — PROGRESS NOTES
"Subjective   Raine Wang is a 42 y.o. female.     History of Present Illness   Here for f/u on:    Probable REBECCA - she had home sleep study but was non-diagnostic, so she is in the process of scheduling one at the sleep lab    Allergies- she is on allergy shots now and is starting to feel better through [facility] ( Elizabeth Mentzer). She has order for labs and wants to do here for further testing of possible immunodeficiency that Elizabeth Mentzer had ordered. She had been on singulair, flonase, and symbicort, but felt worse and agitated so is off these now    ADHD - Her psychiatric ARNP Hodan Babin, has started her on vyvanse 50 and has worked well but needs a PA. Their office will do PA, but our office needs to submit it as she is not a provider for [employer] medicaid so can't submit the PA    The following portions of the patient's history were reviewed and updated as appropriate: allergies, current medications, past family history, past medical history, past social history, past surgical history and problem list.    Review of Systems   Constitutional: Positive for appetite change (decreased) and unexpected weight loss. Negative for activity change and unexpected weight gain (less snacking w/ the vyvanse).   HENT: Negative for congestion and rhinorrhea.    Respiratory: Negative for shortness of breath and wheezing.    Allergic/Immunologic: Positive for environmental allergies.   Psychiatric/Behavioral: Positive for decreased concentration and positive for hyperactivity.        Improved w/ vyvanse       Objective    Vitals:    09/17/18 1502   BP: 122/86   BP Location: Right arm   Pulse: 95   Temp: 98.3 °F (36.8 °C)   TempSrc: Temporal Artery    SpO2: 99%   Weight: (!) 153 kg (336 lb 6.4 oz)   Height: 165.1 cm (65\")     Physical Exam   Constitutional: She is oriented to person, place, and time. She appears well-developed and well-nourished. No distress.   HENT:   Head: Normocephalic and atraumatic. "   Eyes: Pupils are equal, round, and reactive to light. EOM are normal.   Neck: Normal range of motion. Neck supple.   Cardiovascular: Normal rate and regular rhythm.    Pulmonary/Chest: Effort normal and breath sounds normal.   Musculoskeletal: She exhibits no edema.   Neurological: She is alert and oriented to person, place, and time. No cranial nerve deficit.   Skin: Skin is warm and dry. No rash noted. She is not diaphoretic.   Psychiatric: She has a normal mood and affect. Her behavior is normal. Judgment and thought content normal.         Assessment/Plan   Raine was seen today for adhd and allergies.    Diagnoses and all orders for this visit:    Selective deficiency of IgG (CMS/HCC) - we will do the labs requested by her allergist and forward them to her  -     IgG, IgA, IgM  -     IgE  -     Comprehensive Metabolic Panel  -     CBC & Differential  -     Pneumococcal Antibody (23 Serotype)  -     IgG Subclassses (1-4)  -     CBC Auto Differential      Attention deficit hyperactivity disorder (ADHD), combined type - I told her we can try to submit PA - Hodan Gagnon will send here      Allergies - improving on allergy shots

## 2018-09-17 ENCOUNTER — OFFICE VISIT (OUTPATIENT)
Dept: INTERNAL MEDICINE | Facility: CLINIC | Age: 43
End: 2018-09-17

## 2018-09-17 VITALS
HEART RATE: 95 BPM | HEIGHT: 65 IN | SYSTOLIC BLOOD PRESSURE: 122 MMHG | TEMPERATURE: 98.3 F | DIASTOLIC BLOOD PRESSURE: 86 MMHG | WEIGHT: 293 LBS | OXYGEN SATURATION: 99 % | BODY MASS INDEX: 48.82 KG/M2

## 2018-09-17 DIAGNOSIS — J30.2 SEASONAL ALLERGIC RHINITIS, UNSPECIFIED CHRONICITY, UNSPECIFIED TRIGGER: ICD-10-CM

## 2018-09-17 DIAGNOSIS — F90.2 ATTENTION DEFICIT HYPERACTIVITY DISORDER (ADHD), COMBINED TYPE: ICD-10-CM

## 2018-09-17 DIAGNOSIS — M25.50 PAIN IN JOINT, MULTIPLE SITES: ICD-10-CM

## 2018-09-17 DIAGNOSIS — D80.3 SELECTIVE DEFICIENCY OF IGG (HCC): Primary | ICD-10-CM

## 2018-09-17 LAB
ALBUMIN SERPL-MCNC: 4.15 G/DL (ref 3.2–4.8)
ALBUMIN/GLOB SERPL: 2.5 G/DL (ref 1.5–2.5)
ALP SERPL-CCNC: 79 U/L (ref 25–100)
ALT SERPL W P-5'-P-CCNC: 32 U/L (ref 7–40)
ANION GAP SERPL CALCULATED.3IONS-SCNC: 7 MMOL/L (ref 3–11)
AST SERPL-CCNC: 19 U/L (ref 0–33)
BASOPHILS # BLD AUTO: 0.04 10*3/MM3 (ref 0–0.2)
BASOPHILS NFR BLD AUTO: 0.4 % (ref 0–1)
BILIRUB SERPL-MCNC: 0.8 MG/DL (ref 0.3–1.2)
BUN BLD-MCNC: 9 MG/DL (ref 9–23)
BUN/CREAT SERPL: 12.7 (ref 7–25)
CALCIUM SPEC-SCNC: 8.7 MG/DL (ref 8.7–10.4)
CHLORIDE SERPL-SCNC: 109 MMOL/L (ref 99–109)
CO2 SERPL-SCNC: 24 MMOL/L (ref 20–31)
CREAT BLD-MCNC: 0.71 MG/DL (ref 0.6–1.3)
DEPRECATED RDW RBC AUTO: 42.2 FL (ref 37–54)
EOSINOPHIL # BLD AUTO: 0.3 10*3/MM3 (ref 0–0.3)
EOSINOPHIL NFR BLD AUTO: 3.2 % (ref 0–3)
ERYTHROCYTE [DISTWIDTH] IN BLOOD BY AUTOMATED COUNT: 13 % (ref 11.3–14.5)
GFR SERPL CREATININE-BSD FRML MDRD: 91 ML/MIN/1.73
GLOBULIN UR ELPH-MCNC: 1.7 GM/DL
GLUCOSE BLD-MCNC: 92 MG/DL (ref 70–100)
HCT VFR BLD AUTO: 47.5 % (ref 34.5–44)
HGB BLD-MCNC: 15.6 G/DL (ref 11.5–15.5)
IMM GRANULOCYTES # BLD: 0.04 10*3/MM3 (ref 0–0.03)
IMM GRANULOCYTES NFR BLD: 0.4 % (ref 0–0.6)
LYMPHOCYTES # BLD AUTO: 2.26 10*3/MM3 (ref 0.6–4.8)
LYMPHOCYTES NFR BLD AUTO: 24.4 % (ref 24–44)
MCH RBC QN AUTO: 29.5 PG (ref 27–31)
MCHC RBC AUTO-ENTMCNC: 32.8 G/DL (ref 32–36)
MCV RBC AUTO: 89.8 FL (ref 80–99)
MONOCYTES # BLD AUTO: 0.59 10*3/MM3 (ref 0–1)
MONOCYTES NFR BLD AUTO: 6.4 % (ref 0–12)
NEUTROPHILS # BLD AUTO: 6.06 10*3/MM3 (ref 1.5–8.3)
NEUTROPHILS NFR BLD AUTO: 65.6 % (ref 41–71)
PLATELET # BLD AUTO: 244 10*3/MM3 (ref 150–450)
PMV BLD AUTO: 11.6 FL (ref 6–12)
POTASSIUM BLD-SCNC: 4.4 MMOL/L (ref 3.5–5.5)
PROT SERPL-MCNC: 5.8 G/DL (ref 5.7–8.2)
RBC # BLD AUTO: 5.29 10*6/MM3 (ref 3.89–5.14)
SODIUM BLD-SCNC: 140 MMOL/L (ref 132–146)
WBC NRBC COR # BLD: 9.25 10*3/MM3 (ref 3.5–10.8)

## 2018-09-17 PROCEDURE — 36415 COLL VENOUS BLD VENIPUNCTURE: CPT | Performed by: INTERNAL MEDICINE

## 2018-09-17 PROCEDURE — 80053 COMPREHEN METABOLIC PANEL: CPT | Performed by: INTERNAL MEDICINE

## 2018-09-17 PROCEDURE — 85025 COMPLETE CBC W/AUTO DIFF WBC: CPT | Performed by: INTERNAL MEDICINE

## 2018-09-17 PROCEDURE — 82787 IGG 1 2 3 OR 4 EACH: CPT | Performed by: INTERNAL MEDICINE

## 2018-09-17 PROCEDURE — 99214 OFFICE O/P EST MOD 30 MIN: CPT | Performed by: INTERNAL MEDICINE

## 2018-09-17 PROCEDURE — 82784 ASSAY IGA/IGD/IGG/IGM EACH: CPT | Performed by: INTERNAL MEDICINE

## 2018-09-17 PROCEDURE — 86317 IMMUNOASSAY INFECTIOUS AGENT: CPT | Performed by: INTERNAL MEDICINE

## 2018-09-17 PROCEDURE — 82785 ASSAY OF IGE: CPT | Performed by: INTERNAL MEDICINE

## 2018-09-19 LAB
IGA SERPL-MCNC: 116 MG/DL (ref 87–352)
IGG SERPL-MCNC: 570 MG/DL (ref 700–1600)
IGM SERPL-MCNC: 35 MG/DL (ref 26–217)

## 2018-09-20 LAB
DEPRECATED S PNEUM 1 IGG SER-MCNC: NORMAL UG/ML
DEPRECATED S PNEUM12 IGG SER-MCNC: NORMAL UG/ML
DEPRECATED S PNEUM14 IGG SER-MCNC: NORMAL UG/ML
DEPRECATED S PNEUM19 IGG SER-MCNC: NORMAL UG/ML
DEPRECATED S PNEUM23 IGG SER-MCNC: NORMAL UG/ML
DEPRECATED S PNEUM3 IGG SER-MCNC: NORMAL UG/ML
DEPRECATED S PNEUM4 IGG SER-MCNC: NORMAL UG/ML
DEPRECATED S PNEUM8 IGG SER-MCNC: NORMAL UG/ML
DEPRECATED S PNEUM9 IGG SER-MCNC: NORMAL UG/ML
IGG SERPL-MCNC: 570 MG/DL (ref 700–1600)
IGG1 SER-MCNC: 291 MG/DL (ref 248–810)
IGG2 SER-MCNC: 184 MG/DL (ref 130–555)
IGG3 SER-MCNC: 37 MG/DL (ref 15–102)
IGG4 SER-MCNC: 12 MG/DL (ref 2–96)
PNEUMO AB TYPE 17 (17F)*: NORMAL
PNEUMO AB TYPE 2*: NORMAL
PNEUMO AB TYPE 20*: NORMAL
PNEUMO AB TYPE 22 (22F)*: NORMAL
PNEUMO AB TYPE 34 (10A)*: NORMAL
PNEUMO AB TYPE 43 (11A)*: NORMAL
PNEUMO AB TYPE 5*: NORMAL
PNEUMO AB TYPE 54 (15B)*: NORMAL
PNEUMO AB TYPE 70 (33F)*: NORMAL
S PNEUM DA 18C IGG SER-MCNC: NORMAL
S PNEUM DA 19A IGG SER-MCNC: NORMAL UG/ML
S PNEUM DA 6B IGG SER-MCNC: NORMAL UG/ML
S PNEUM DA 7F IGG SER-MCNC: NORMAL UG/ML
S PNEUM DA 9V IGG SER-MCNC: NORMAL UG/ML

## 2018-09-21 DIAGNOSIS — D84.9 IMMUNODEFICIENCY (HCC): Primary | ICD-10-CM

## 2018-09-21 LAB — TOTAL IGE SMQN RAST: 58 IU/ML (ref 0–100)

## 2018-09-27 ENCOUNTER — TELEPHONE (OUTPATIENT)
Dept: INTERNAL MEDICINE | Facility: CLINIC | Age: 43
End: 2018-09-27

## 2018-10-19 ENCOUNTER — TELEPHONE (OUTPATIENT)
Dept: INTERNAL MEDICINE | Facility: CLINIC | Age: 43
End: 2018-10-19

## 2018-10-19 NOTE — TELEPHONE ENCOUNTER
----- Message from Latrice Blanton MD sent at 10/19/2018  7:44 AM EDT -----  Can you fax these labs to Elizabeth Mentzer at [facility], fax [phone]

## 2019-05-01 ENCOUNTER — OFFICE VISIT (OUTPATIENT)
Dept: INTERNAL MEDICINE | Facility: CLINIC | Age: 44
End: 2019-05-01

## 2019-05-01 VITALS
DIASTOLIC BLOOD PRESSURE: 76 MMHG | SYSTOLIC BLOOD PRESSURE: 118 MMHG | TEMPERATURE: 97.1 F | WEIGHT: 293 LBS | HEIGHT: 65 IN | BODY MASS INDEX: 48.82 KG/M2

## 2019-05-01 DIAGNOSIS — Z23 NEED FOR VACCINATION AGAINST STREPTOCOCCUS PNEUMONIAE USING PNEUMOCOCCAL CONJUGATE VACCINE 13: ICD-10-CM

## 2019-05-01 DIAGNOSIS — K13.79 RECURRENT ORAL ULCERS: ICD-10-CM

## 2019-05-01 DIAGNOSIS — D83.9 CVID (COMMON VARIABLE IMMUNODEFICIENCY) (HCC): Primary | ICD-10-CM

## 2019-05-01 DIAGNOSIS — J45.20 INTERMITTENT ASTHMA WITHOUT COMPLICATION, UNSPECIFIED ASTHMA SEVERITY: ICD-10-CM

## 2019-05-01 PROCEDURE — 90471 IMMUNIZATION ADMIN: CPT | Performed by: INTERNAL MEDICINE

## 2019-05-01 PROCEDURE — 90670 PCV13 VACCINE IM: CPT | Performed by: INTERNAL MEDICINE

## 2019-05-01 PROCEDURE — 99213 OFFICE O/P EST LOW 20 MIN: CPT | Performed by: INTERNAL MEDICINE

## 2019-05-01 RX ORDER — TRIAMCINOLONE ACETONIDE 0.1 %
PASTE (GRAM) DENTAL
Qty: 5 G | Refills: 1 | Status: SHIPPED | OUTPATIENT
Start: 2019-05-01 | End: 2020-01-15

## 2019-05-02 ENCOUNTER — TELEPHONE (OUTPATIENT)
Dept: INTERNAL MEDICINE | Facility: CLINIC | Age: 44
End: 2019-05-02

## 2019-05-02 NOTE — TELEPHONE ENCOUNTER
Pt states she had pneumonia shot in office yesterday.  Injection site is red, hot and has a ring around it.  She has been taking ibuprofen.  She declines fever, facial or throat swelling, hives.  She will continue the ibuprofen and use ice.  Will go to ER if any hives, facial or throat swelling, fast heartbeat, dizziness, or weakness.

## 2019-05-07 NOTE — TELEPHONE ENCOUNTER
PT CALLED WITH CONCERNS OF REACTION TO THE PNEUMONIA SHOT.  SHE STATES THAT THE SITE IS SWOLLEN AND WHELPED UP.  SHE SAW [facility] YESTERDAY AND THEY PLACED HER ON ZPAK AND STEROIDS.  PT STATES ICE HAS HELPED AND IS HAVING NO OTHER SYMPTOMS THAT SHE CAN TELL.  I REITERATED THAT SHE SHOULD GO TO THE ER IF THERE IS ANY SOA, FACIAL OR THROAT SWELLING.  SHE WOULD LIKE DR. JOSHI OR LALIT TO CALL HER BACK TO JUST MAKE SURE THERE ISN'T MORE THAT NEEDS TO BE DONE. SHE WAS ALSO GOING TO TRY BENADRYL CREAM

## 2019-05-08 NOTE — TELEPHONE ENCOUNTER
I don't think there would be anything else to do since she is on antibiotics and steroids. I think using the benadryl cream hopefully will help too, and to continue her usual antihistamine she takes for her allergies

## 2019-05-16 ENCOUNTER — OFFICE VISIT (OUTPATIENT)
Dept: INTERNAL MEDICINE | Facility: CLINIC | Age: 44
End: 2019-05-16

## 2019-05-16 VITALS
TEMPERATURE: 96.9 F | BODY MASS INDEX: 48.82 KG/M2 | RESPIRATION RATE: 22 BRPM | OXYGEN SATURATION: 98 % | HEART RATE: 85 BPM | SYSTOLIC BLOOD PRESSURE: 124 MMHG | WEIGHT: 293 LBS | HEIGHT: 65 IN | DIASTOLIC BLOOD PRESSURE: 82 MMHG

## 2019-05-16 DIAGNOSIS — J06.9 ACUTE URI: Primary | ICD-10-CM

## 2019-05-16 PROCEDURE — 99213 OFFICE O/P EST LOW 20 MIN: CPT | Performed by: NURSE PRACTITIONER

## 2019-05-16 RX ORDER — ALBUTEROL SULFATE 90 UG/1
2 AEROSOL, METERED RESPIRATORY (INHALATION) EVERY 4 HOURS PRN
Qty: 1 INHALER | Refills: 2 | Status: SHIPPED | OUTPATIENT
Start: 2019-05-16 | End: 2019-11-11 | Stop reason: SDUPTHER

## 2019-05-16 RX ORDER — DOXYCYCLINE HYCLATE 100 MG/1
100 CAPSULE ORAL 2 TIMES DAILY
Qty: 14 CAPSULE | Refills: 0 | Status: SHIPPED | OUTPATIENT
Start: 2019-05-16 | End: 2020-01-15

## 2019-05-16 RX ORDER — PREDNISONE 10 MG/1
TABLET ORAL
COMMUNITY
Start: 2019-05-06 | End: 2020-01-15

## 2019-05-16 NOTE — PROGRESS NOTES
Subjective   Raine Wang is a 43 y.o. female.     Chief Complaint   Patient presents with   • Pneumonia      congestion with difficult breathing, cough, fatigue,fever last night.  Pneumonia last week.  History of common variable immune defiency.       History of Present Illness     Reports she came in to get pneumonia vaccine 5/1/2019 and did not feel good but did not have fever so she went ahead and got it. She later developed fever and some worsening URI symptoms.   Went to allergist/immunologist ([facility]) office last Monday where she reports she was diagnosed with pneumonia. She was treated with azithromycin and states  Symptoms have not improved. [facility] has her on 90 day round of Augmentin 875 BID until she can start IGG infusions for immune deficiency. .  She was also started on prednisone taper (60 mg start dose).   She is doing nebs.   Started getting better but now worsening.   TMax 100.8 last night.  Took ibuprofen this am.       The following portions of the patient's history were reviewed and updated as appropriate: allergies, current medications, past family history, past medical history, past social history, past surgical history and problem list.    Review of Systems   Constitutional: Positive for chills, fatigue and fever. Negative for activity change, appetite change, diaphoresis and unexpected weight change.   HENT: Positive for congestion, sneezing and voice change. Negative for dental problem, ear pain, postnasal drip, rhinorrhea, sinus pressure, sinus pain, sore throat and tinnitus.    Respiratory: Positive for cough, chest tightness, shortness of breath and wheezing.        Outpatient Medications Marked as Taking for the 5/16/19 encounter (Office Visit) with Yoko Pritchard APRN   Medication Sig Dispense Refill   • albuterol (PROVENTIL) (2.5 MG/3ML) 0.083% nebulizer solution Take 2.5 mg by nebulization Every 4 (Four) Hours As Needed for Wheezing. 25 vial 1   •  albuterol sulfate HFA (VENTOLIN HFA) 108 (90 Base) MCG/ACT inhaler Inhale 2 puffs Every 4 (Four) Hours As Needed for Wheezing or Shortness of Air. 1 inhaler 2   • ALLERGY SERUM INJECTION Inject 0.25 mL under the skin into the appropriate area as directed 1 (One) Time. Get these done at her work.  Gets in both arms.     • ALPRAZolam (XANAX) 1 MG tablet Take 1 mg by mouth 2 (Two) Times a Day As Needed. 1/2 prn     • fexofenadine (ALLEGRA) 180 MG tablet Take 180 mg by mouth daily.     • lisdexamfetamine (VYVANSE) 50 MG capsule Take 50 mg by mouth Every Morning     • pseudoephedrine (SUDAFED) 120 MG 12 hr tablet Take 1 tablet by mouth Every 12 (Twelve) Hours. 60 tablet 5   • triamcinolone (KENALOG) 0.1 % paste Apply to oral lesions tid prn 5 g 1   • Vortioxetine HBr (TRINTELLIX) 10 MG tablet Take 20 mg by mouth Daily.     • [DISCONTINUED] VENTOLIN  (90 Base) MCG/ACT inhaler INHALE TWO PUFFS BY MOUTH EVERY 4 HOURS AS NEEDED FOR WHEEZING 1 inhaler 10         Objective   Physical Exam   Constitutional: She is oriented to person, place, and time. She appears well-developed and well-nourished. No distress.   HENT:   Head: Normocephalic and atraumatic.   Right Ear: External ear normal.   Left Ear: External ear normal.   Nose: Nose normal.   Mouth/Throat: Oropharynx is clear and moist. No oropharyngeal exudate.   Eyes: Conjunctivae are normal. Pupils are equal, round, and reactive to light. Right eye exhibits no discharge. Left eye exhibits no discharge.   Neck: Normal range of motion. Neck supple.   Cardiovascular: Normal rate, regular rhythm and normal heart sounds.   Pulmonary/Chest: Effort normal. No respiratory distress. She has wheezes.   Abdominal: Soft. Normal appearance and bowel sounds are normal. There is no tenderness.   Musculoskeletal: Normal range of motion.   Lymphadenopathy:     She has no cervical adenopathy.   Neurological: She is alert and oriented to person, place, and time.   Skin: Skin is warm and  dry. Capillary refill takes less than 2 seconds. She is not diaphoretic.   Psychiatric: She has a normal mood and affect. Her behavior is normal. Judgment and thought content normal.   Nursing note and vitals reviewed.      Vitals:    05/16/19 1357   BP: 124/82   Pulse: 85   Resp: 22   Temp: 96.9 °F (36.1 °C)   SpO2: 98%         Assessment/Plan   Raine was seen today for pneumonia.    Diagnoses and all orders for this visit:    Acute URI  -     doxycycline (VIBRAMYCIN) 100 MG capsule; Take 1 capsule by mouth 2 (Two) Times a Day.  -     albuterol sulfate HFA (VENTOLIN HFA) 108 (90 Base) MCG/ACT inhaler; Inhale 2 puffs Every 4 (Four) Hours As Needed for Wheezing or Shortness of Air.              She declined labs and CXR at this time.   Will add doxycycline.   She will take probiotic as well  Increase rest and fluids.   May take OTC mucinex  Return if symptoms worsen or fail to improve.  Plan of care discussed with pt. They verbalized understanding and agreement.       * Please note that portions of this note were completed with a voice recognition program. Efforts were made to edit the dictation but occasionally words are erroneously transcribed.

## 2019-05-30 RX ORDER — PSEUDOEPHEDRINE HYDROCHLORIDE 120 MG/1
TABLET, FILM COATED, EXTENDED RELEASE ORAL
Qty: 60 TABLET | Refills: 11 | Status: SHIPPED | OUTPATIENT
Start: 2019-05-30 | End: 2019-07-31 | Stop reason: SDUPTHER

## 2019-07-31 RX ORDER — PSEUDOEPHEDRINE HYDROCHLORIDE 120 MG/1
TABLET, FILM COATED, EXTENDED RELEASE ORAL
Qty: 60 TABLET | Refills: 10 | Status: SHIPPED | OUTPATIENT
Start: 2019-07-31

## 2019-11-11 DIAGNOSIS — J06.9 ACUTE URI: ICD-10-CM

## 2019-11-12 RX ORDER — ALBUTEROL SULFATE 90 UG/1
AEROSOL, METERED RESPIRATORY (INHALATION)
Qty: 18 G | Refills: 0 | Status: SHIPPED | OUTPATIENT
Start: 2019-11-12 | End: 2020-05-29 | Stop reason: SDUPTHER

## 2020-01-13 NOTE — PROGRESS NOTES
Subjective   Raine Wang is a 44 y.o. female.     History of Present Illness     Here for f/u on:    CVID- she sees allergist regularly. She initially saw Alanna Muse, then saw Dr Solis for second opinion, then ended up seeing Alanna Muse again, who is managing the CVID, but pt would like to go to Dr Stephenson at , who is her mother's physician.  She has called his office and they do need a referral from primary care. She is on Gamunex 15 SC twice a week that she gives herself but has not noticed that much difference so far. Uses periactin for the reaction for the IgG  She has had a fever on and off for the last few weeks. She doesn' t think she has a sinus infection right now. She is using saline nebs, sudafed prn, allegra daily, mucinex prn.  Not really any chest congestion or wheezing.    Allergies - she was on allergy shots but were stopped because her Ig levels were too low.  She would like to see ear nose throat as well.  She has a history of ear tubes when she was younger and her ears feel like they constantly have fluid.  She has also been told that she needs her tonsils out    Knee pain R - has had injuries before, and she was stepping off porch about 3 weeks ago andknee hyperflexed, and she felt like knee cap dislocated. Has been hard to stand, and knee gives out. Had  Knee xr in '17 after fall and was negative. Hurts just to stand., and has given out a few times  Needs a transfer bench - talked w/ insurance and they are going to send in order for us to sign  She is using naproxen that urgent treatment had given her.    Current Outpatient Medications on File Prior to Visit   Medication Sig Dispense Refill   • albuterol (PROVENTIL) (2.5 MG/3ML) 0.083% nebulizer solution Take 2.5 mg by nebulization Every 4 (Four) Hours As Needed for Wheezing. 25 vial 1   • ALBUTEROL SULFATE  (90 Base) MCG/ACT inhaler INHALE 2 PUFF BY MOUTH EVERY 4 HOURS AS NEEDED FOR WHEEZING OR SHORTNESS OF AIR.  "18 g 0   • ALLERGY SERUM INJECTION Inject 0.25 mL under the skin into the appropriate area as directed 1 (One) Time. Get these done at her work.  Gets in both arms.     • ALPRAZolam (XANAX) 1 MG tablet Take 1 mg by mouth 2 (Two) Times a Day As Needed. 1/2 prn     • doxycycline (VIBRAMYCIN) 100 MG capsule Take 1 capsule by mouth 2 (Two) Times a Day. 14 capsule 0   • fexofenadine (ALLEGRA) 180 MG tablet Take 180 mg by mouth daily.     • lisdexamfetamine (VYVANSE) 50 MG capsule Take 50 mg by mouth Every Morning     • predniSONE (DELTASONE) 10 MG tablet      • SUDOGEST 12 HOUR 120 MG 12 hr tablet TAKE (1) TABLET BY MOUTH EVERY 12 HOURS. 60 tablet 10   • triamcinolone (KENALOG) 0.1 % paste Apply to oral lesions tid prn 5 g 1   • Vortioxetine HBr (TRINTELLIX) 10 MG tablet Take 20 mg by mouth Daily.       No current facility-administered medications on file prior to visit.        The following portions of the patient's history were reviewed and updated as appropriate: allergies, current medications, past family history, past medical history, past social history, past surgical history and problem list.    Review of Systems   Constitutional: Positive for activity change, appetite change, fatigue, fever and unexpected weight gain. Negative for unexpected weight loss.   HENT: Positive for congestion, ear pain, postnasal drip and sinus pressure. Negative for rhinorrhea.    Eyes: Positive for redness (was treated for conjuctivitis B at [facility] last week).   Respiratory: Positive for cough. Negative for shortness of breath.    Musculoskeletal: Positive for arthralgias (knee pain).   Skin: Positive for rash.   Allergic/Immunologic: Positive for environmental allergies and immunocompromised state. Negative for food allergies.       Objective   /78 (BP Location: Left arm, Patient Position: Sitting)   Pulse 91   Temp 97.9 °F (36.6 °C) (Temporal)   Ht 165.1 cm (65\")   Wt (!) 174 kg (383 lb 6.4 oz)   SpO2 100%   BMI 63.80 kg/m² "   Physical Exam   Constitutional: She is oriented to person, place, and time. She appears well-developed and well-nourished. No distress.   HENT:   Head: Normocephalic and atraumatic.   Right Ear: External ear normal.   Left Ear: External ear normal.   B tm dull, tonsils large but no erythema or drainage or exudate today   Eyes: Pupils are equal, round, and reactive to light. Conjunctivae and EOM are normal. Right eye exhibits no discharge. Left eye exhibits no discharge.   Neck: Normal range of motion. Neck supple.   Cardiovascular: Normal rate and regular rhythm.   Pulmonary/Chest: Effort normal and breath sounds normal.   Musculoskeletal: She exhibits edema (r knee) and tenderness (over right knee). She exhibits no deformity.   Very tender to the touch and has pain with any motion of the knee.   Neurological: She is alert and oriented to person, place, and time. No cranial nerve deficit.   Skin: Skin is warm and dry. No rash noted. She is not diaphoretic.   Psychiatric: She has a normal mood and affect. Her behavior is normal. Judgment and thought content normal.   Nursing note and vitals reviewed.        Assessment/Plan   Raine was seen today for cvid, knee pain and sinus pressure.    Diagnoses and all orders for this visit:    CVID (common variable immunodeficiency) (CMS/[facility]) -has been seeing allergist regularly but would like to get in with Dr. Stephenson at .  We will get this scheduled.  I did not order any antibiotics today as it is not clear that patient has an infection but if her symptoms worsen she will check with her allergist who has been managing her infections so far  -     Ambulatory Referral to Immunology    Bilateral chronic serous otitis media -like to go ahead and see ear nose throat to consider tubes  -     Ambulatory Referral to ENT (Otolaryngology)    Acute pain of right knee -patient with much tenderness on exam today.  We will go ahead and have her see Ortho as soon as possible.   Continue the naproxen, and icing and elevation  -     Ambulatory Referral to Orthopedic Surgery

## 2020-01-15 ENCOUNTER — OFFICE VISIT (OUTPATIENT)
Dept: INTERNAL MEDICINE | Facility: CLINIC | Age: 45
End: 2020-01-15

## 2020-01-15 VITALS
TEMPERATURE: 97.9 F | HEIGHT: 65 IN | WEIGHT: 293 LBS | HEART RATE: 91 BPM | DIASTOLIC BLOOD PRESSURE: 78 MMHG | SYSTOLIC BLOOD PRESSURE: 122 MMHG | OXYGEN SATURATION: 100 % | BODY MASS INDEX: 48.82 KG/M2

## 2020-01-15 DIAGNOSIS — H65.23 BILATERAL CHRONIC SEROUS OTITIS MEDIA: ICD-10-CM

## 2020-01-15 DIAGNOSIS — D83.9 CVID (COMMON VARIABLE IMMUNODEFICIENCY) (HCC): Primary | ICD-10-CM

## 2020-01-15 DIAGNOSIS — M25.561 ACUTE PAIN OF RIGHT KNEE: ICD-10-CM

## 2020-01-15 PROCEDURE — 99214 OFFICE O/P EST MOD 30 MIN: CPT | Performed by: INTERNAL MEDICINE

## 2020-01-20 DIAGNOSIS — D83.9 CVID (COMMON VARIABLE IMMUNODEFICIENCY) (HCC): Primary | ICD-10-CM

## 2020-01-23 ENCOUNTER — TELEPHONE (OUTPATIENT)
Dept: INTERNAL MEDICINE | Facility: CLINIC | Age: 45
End: 2020-01-23

## 2020-01-23 NOTE — TELEPHONE ENCOUNTER
Spoke to patient regarding her lab orders that we placed for Dr Stephenson at  so that patient may be seen and evaluated. Patient states she is having trouble with getting her immunotherapy right now due to a recall on her medication. She is awaiting authorization now for a new medication and is worried about getting sick so she wishes to delay all referrals right now. She will contact me when she is ready to proceed.

## 2020-03-09 ENCOUNTER — TELEPHONE (OUTPATIENT)
Dept: INTERNAL MEDICINE | Facility: CLINIC | Age: 45
End: 2020-03-09

## 2020-03-09 PROBLEM — I26.99 PULMONARY EMBOLISM (HCC): Status: ACTIVE | Noted: 2020-03-05

## 2020-03-09 NOTE — TELEPHONE ENCOUNTER
PT STATES THAT SHE WAS ADMITTED TO [facility] ON 3/6 AND 3/7. PT WANT'S TO KNOW IF HER MEDICAL RECORDS HAVE BEEN SENT OVER AND WILL BE THERE BEFORE HER APPT ON 3/11. IF NOT DOES SHE NEED TO GET THEM HERSELF.    PLEASE CALL AND ADVISE PT @ [phone]

## 2020-03-11 ENCOUNTER — OFFICE VISIT (OUTPATIENT)
Dept: INTERNAL MEDICINE | Facility: CLINIC | Age: 45
End: 2020-03-11

## 2020-03-11 VITALS
RESPIRATION RATE: 20 BRPM | WEIGHT: 293 LBS | SYSTOLIC BLOOD PRESSURE: 126 MMHG | TEMPERATURE: 98.9 F | BODY MASS INDEX: 48.82 KG/M2 | OXYGEN SATURATION: 98 % | DIASTOLIC BLOOD PRESSURE: 84 MMHG | HEART RATE: 93 BPM | HEIGHT: 65 IN

## 2020-03-11 DIAGNOSIS — D83.9 CVID (COMMON VARIABLE IMMUNODEFICIENCY) (HCC): ICD-10-CM

## 2020-03-11 DIAGNOSIS — I26.99 BILATERAL PULMONARY EMBOLISM (HCC): Primary | ICD-10-CM

## 2020-03-11 PROCEDURE — 99214 OFFICE O/P EST MOD 30 MIN: CPT | Performed by: NURSE PRACTITIONER

## 2020-03-11 RX ORDER — HUMAN IMMUNOGLOBULIN G 0.2 G/ML
LIQUID SUBCUTANEOUS
COMMUNITY
Start: 2020-01-24

## 2020-03-11 NOTE — PROGRESS NOTES
Raine Wang presents today for follow up.    CHIEF COMPLAINT:  Pulmonary Embolism (Hospital follow up)     CELENA Ni is here today for a hospital follow-up.  She was hospitalized at [facility] from 3/6/2020 through 3/7/2020.  I have received and reviewed the H&P but have not received her discharge summary is not available to me.  She initially presented to the emergency department due to shortness of breath.  She was noted to have hypoxic respiratory failure secondary to bilateral pulmonary embolisms.  She had a a CTA which showed the bilateral pulmonary emboli.  She had a left lower extremity duplex which they were unable to fully complete due to increased pain Raine noted with the exam.  The portions of the exam that they did complete were negative for DVT.  She had a CBC CMP that were unremarkable but was noted to have a positive d-dimer.  Troponin was negative, BNP was normal.  PT/INR was normal  Chest x-ray showed some hypoaeration and continued follow-up was recommended    She was started on anticoagulation.  She declined heparin drip during her hospitalization due to multiple needle sticks required with that but they started her on Lovenox and then ultimately started her on Xarelto.  She has on the starter pack and is well versed and dosing on this.  Her pulmonary emboli were thought to be due to her infusions she gets from asthma and allergy for her common variable immunodeficiency.  Raine is not sure if they did a hypercoagulable work-up on her but does not believe they did.   She denies any bleeding or bruising.  She does still have some swelling to the left lower extremity.  She is still slightly short of breath and has some pain in the chest since that hospitalization.  This is not worsening is actually slowly improving.    She has an appointment with [facility] asthma and allergy later today to discuss the plan for her CVID.      Review of Systems   Constitutional: Positive for  "fatigue. Negative for appetite change, chills, diaphoresis, fever, unexpected weight gain and unexpected weight loss.   Eyes: Negative for blurred vision and visual disturbance.   Respiratory: Positive for shortness of breath (mild, improving). Negative for cough, chest tightness and wheezing.    Cardiovascular: Positive for chest pain and leg swelling (left leg). Negative for palpitations.   Gastrointestinal: Negative for abdominal pain, constipation, diarrhea, nausea and vomiting.   Musculoskeletal: Negative for neck stiffness.   Skin: Negative for rash.   Neurological: Negative for dizziness, light-headedness and headache.   Psychiatric/Behavioral: Negative for sleep disturbance, depressed mood and stress. The patient is not nervous/anxious.         The following portions of the patient's history were reviewed and updated as appropriate: allergies, current medications, past family history, past medical history, past social history, past surgical history and problem list.    Visit Vitals  /84   Pulse 93   Temp 98.9 °F (37.2 °C)   Resp 20   Ht 165.1 cm (65\")   Wt (!) 174 kg (383 lb)   LMP 02/15/2020 (Approximate)   SpO2 98%   Breastfeeding No   BMI 63.73 kg/m²        Physical Exam   Constitutional: She is oriented to person, place, and time. She appears well-developed and well-nourished. She is cooperative.  Non-toxic appearance. She does not have a sickly appearance. She does not appear ill. No distress.   HENT:   Head: Normocephalic.   Eyes: Pupils are equal, round, and reactive to light. Conjunctivae are normal.   Cardiovascular: Normal rate, regular rhythm and normal heart sounds.   No murmur heard.  Pulmonary/Chest: Effort normal and breath sounds normal. No accessory muscle usage. No respiratory distress. She has no decreased breath sounds. She has no wheezes. She has no rhonchi. She exhibits no tenderness and no crepitus.   Musculoskeletal: She exhibits edema (non pitting to LLE from ankle to mid calf. " negative homans, no redness, warmth, or localized tenderness).   Neurological: She is alert and oriented to person, place, and time. She has normal strength.   Skin: Skin is warm, dry and intact. No rash noted. She is not diaphoretic.   Psychiatric: She has a normal mood and affect. Her speech is normal and behavior is normal. Judgment and thought content normal.   Vitals reviewed.    ASSESSMENT/PLAN  Diagnoses and all orders for this visit:    1. Bilateral pulmonary embolism (CMS/HCC) (Primary)  -     Ambulatory Referral to Hematology    2. CVID (common variable immunodeficiency) (CMS/HCC)    I have reviewed records from [facility] including H&P, imaging and diagnostics as summarized in the HPI section with the patient during her visit in the office today.  She will continue her Xarelto as planned.  I will also refer her to hematology to aid in discussion duration relation.  She will follow-up with later today to discuss plans going forth for infusions related to her CVI D.  It was suspected that the CVI D infusion was the cause of this PE  She may need labs to evaluate for hypercoagulable state but I will defer to hematology for this.    Return in about 3 weeks (around 4/1/2020) for Recheck.  Discussed the nature of the medical condition(s) risks, complications, management, safe and proper use of medications. Encouraged medication compliance and the importance of keeping scheduled follow up appointments with me and any other providers.  Patient instructed to follow up with our office for results on any labs/imaging ordered during this visit.  Patient verbalizes understanding and agrees to treatment plan.

## 2020-03-12 ENCOUNTER — TELEPHONE (OUTPATIENT)
Dept: INTERNAL MEDICINE | Facility: CLINIC | Age: 45
End: 2020-03-12

## 2020-03-12 NOTE — TELEPHONE ENCOUNTER
----- Message from JOSEPH Jackson sent at 3/12/2020  9:06 AM EDT -----  Regarding: records  Can you please check with [facility] and get the actual discharge summary that Raine should have?  I do have some of her documentation but it looks like there could still be a discharge summary.   Thank you

## 2020-03-18 ENCOUNTER — TELEPHONE (OUTPATIENT)
Dept: INTERNAL MEDICINE | Facility: CLINIC | Age: 45
End: 2020-03-18

## 2020-03-18 RX ORDER — TRIAMTERENE AND HYDROCHLOROTHIAZIDE 37.5; 25 MG/1; MG/1
TABLET ORAL
Qty: 30 TABLET | Refills: 0 | Status: SHIPPED | OUTPATIENT
Start: 2020-03-18 | End: 2020-04-13

## 2020-03-18 NOTE — TELEPHONE ENCOUNTER
PT IS HAVING SWELLING IN BOTH LEGS LEFT LEG IS THE WORST. WHEN SHE LAYS DOWN IT GETS BETTER. sHE JUST GOT OUT OF THE HOSPITAL DOESN'T WANT TO COME BACK TO THE OFFICE DUE TO THE COVID-19  SHE WANTS TO KNOW IF SHE SHOULD TAKE A WATER PILL?    CONFIRMED [facility] [address]     PLEASE ADVISE AND GIVE CALL [phone] (H)

## 2020-03-18 NOTE — TELEPHONE ENCOUNTER
It is common to have the swelling after having a blood clot. Usually we don't do a fluid pill, but try other things - try to elevate leg when she is sitting, try to get up and move around every hour, and wearing compression stockings can help. Also cutting back on salt in her diet will help

## 2020-03-18 NOTE — TELEPHONE ENCOUNTER
PN and she has done everything but the compression stockings.  She is worried is she treating the blood clots aggressive enough.  She is on the xarelto twice a day.  She wants to know if she should be on lovenox.  If the swelling is common she won't worry about it.  I let her know if we tried a fluid pill she would need to be seen in 7-10 days.  She has an appointment on 3/30/20 with Yoko Pritchard.  Send med to [facility].

## 2020-03-19 ENCOUNTER — TELEPHONE (OUTPATIENT)
Dept: ONCOLOGY | Facility: CLINIC | Age: 45
End: 2020-03-19

## 2020-03-25 ENCOUNTER — TELEMEDICINE (OUTPATIENT)
Dept: FAMILY MEDICINE CLINIC | Facility: TELEHEALTH | Age: 45
End: 2020-03-25

## 2020-03-25 ENCOUNTER — TELEPHONE (OUTPATIENT)
Dept: INTERNAL MEDICINE | Facility: CLINIC | Age: 45
End: 2020-03-25

## 2020-03-25 DIAGNOSIS — M25.561 BILATERAL ANTERIOR KNEE PAIN: Primary | ICD-10-CM
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
## 2020-03-25 DIAGNOSIS — M25.562 BILATERAL ANTERIOR KNEE PAIN: Primary | ICD-10-CM

## 2020-03-25 PROCEDURE — 99213 OFFICE O/P EST LOW 20 MIN: CPT | Performed by: NURSE PRACTITIONER

## 2020-03-25 NOTE — PROGRESS NOTES
Subjective   Raine Wang is a 44 y.o. female who presents to the clinic with:        Dec Fall and was told not to get Ortho eval by PCP due to her immunocompromised condition.  In meantime, she developed PE and has not been able to continue NSAIDs with Xarelto (new med).  SHe lives alone and having great difficulty walking, cooking, dog care ADLs.  SHe reached out to PCP who told her to do evisit for eval.  She had prednisone tabs at home from last plasma infusion but difficulty tolerating 60 mg daily, made irritable.  PCP gave diuretic for swelling to feet and legs and that is better, Now just pain in bilat knees, right more than left from Dec Fall and compensating.  Has PCP appt 3/30.  SHe has never had xrays or walker.      Knee Pain    The incident occurred more than 1 week ago. The incident occurred at home. The injury mechanism was a fall. The pain is present in the left knee and right knee. The quality of the pain is described as aching. The pain is severe. The pain has been constant since onset. Associated symptoms include an inability to bear weight and a loss of motion. She reports no foreign bodies present. The symptoms are aggravated by movement and weight bearing. She has tried ice and acetaminophen for the symptoms. The treatment provided no relief.          The following portions of the patient's history were reviewed and updated as appropriate: allergies, current medications, past family history, past medical history, past social history, past surgical history and problem list.        Review of Systems   Constitutional: Positive for activity change. Negative for chills and fever.   Musculoskeletal: Positive for gait problem and joint swelling. Negative for back pain.         Objective   Physical Exam   Constitutional:   obese   Musculoskeletal:        Right knee: She exhibits decreased range of motion and swelling. She exhibits no ecchymosis.        Left knee: She exhibits decreased range of  motion and swelling. She exhibits no ecchymosis.         Assessment/Plan   Diagnoses and all orders for this visit:    Bilateral anterior knee pain    Fall, subsequent encounter    Other orders  -     Misc. Devices (GETGO ROLLING WALKER) misc; Please fit and dispense one rolling walker covered by insurance  DX:  Gait disorder, bilat lower extremity swelling and bilat knee pain, H/O Fall  Becca BESS    Prednisone 10 mg (2) tab twice daily for 5 days for burst of antiinflam and continue tylenol 1000 mg QID.  She has the prednisone tabs at home/enough for course.    Rolling walker medical equipment, she may need to use local handwritten script or fax to local medical supply company to deliver/will also consult her case manageer    Ice, ice, ice to bilat knees and followup call/visit with PCP

## 2020-03-25 NOTE — PATIENT INSTRUCTIONS
Acute Knee Pain, Adult  Many things can cause knee pain. Sometimes, knee pain is sudden (acute) and may be caused by damage, swelling, or irritation of the muscles and tissues that support your knee.  The pain often goes away on its own with time and rest. If the pain does not go away, tests may be done to find out what is causing the pain.  Follow these instructions at home:  Pay attention to any changes in your symptoms. Take these actions to relieve your pain.  If you have a knee sleeve or brace:    · Wear the sleeve or brace as told by your doctor. Remove it only as told by your doctor.  · Loosen the sleeve or brace if your toes:  ? Tingle.  ? Become numb.  ? Turn cold and blue.  · Keep the sleeve or brace clean.  · If the sleeve or brace is not waterproof:  ? Do not let it get wet.  ? Cover it with a watertight covering when you take a bath or shower.  Activity  · Rest your knee.  · Do not do things that cause pain.  · Avoid activities where both feet leave the ground at the same time (high-impact activities). Examples are running, jumping rope, and doing jumping jacks.  · Work with a physical therapist to make a safe exercise program, as told by your doctor.  Managing pain, stiffness, and swelling    · If told, put ice on the knee:  ? Put ice in a plastic bag.  ? Place a towel between your skin and the bag.  ? Leave the ice on for 20 minutes, 2-3 times a day.  · If told, put pressure (compression) on your injured knee to control swelling, give support, and help with discomfort. Compression may be done with an elastic bandage.  General instructions  · Take all medicines only as told by your doctor.  · Raise (elevate) your knee while you are sitting or lying down. Make sure your knee is higher than your heart.  · Sleep with a pillow under your knee.  · Do not use any products that contain nicotine or tobacco. These include cigarettes, e-cigarettes, and chewing tobacco. These products may slow down healing. If  you need help quitting, ask your doctor.  · If you are overweight, work with your doctor and a food expert (dietitian) to set goals to lose weight. Being overweight can make your knee hurt more.  · Keep all follow-up visits as told by your doctor. This is important.  Contact a doctor if:  · The knee pain does not stop.  · The knee pain changes or gets worse.  · You have a fever along with knee pain.  · Your knee feels warm when you touch it.  · Your knee gives out or locks up.  Get help right away if:  · Your knee swells, and the swelling gets worse.  · You cannot move your knee.  · You have very bad knee pain.  Summary  · Many things can cause knee pain. The pain often goes away on its own with time and rest.  · Your doctor may do tests to find out the cause of the pain.  · Pay attention to any changes in your symptoms. Relieve your pain with rest, medicines, light activity, and use of ice.  · Get help right away if you cannot move your knee or your knee pain is very bad.  This information is not intended to replace advice given to you by your health care provider. Make sure you discuss any questions you have with your health care provider.  Document Released: 03/16/2010 Document Revised: 05/30/2019 Document Reviewed: 05/30/2019  [employer] Interactive Patient Education © 2020 [employer]

## 2020-03-25 NOTE — TELEPHONE ENCOUNTER
PATIENT STATES SHE FELL IN December AND HURT HER RIGHT KNEE AND SHE HAS NOT WENT TO ORTHO YET BECAUSE OF THE COVID 19 RIGHT NOW.  SHE TALKED TO ORTHO AND THEY TOLD HER TO CALL THEM BACK WHEN IT IS SAFE FOR HER.  SHE STATES SHE HAD A Pulmonary embolism  ABOUT 3 WEEKS.     PATIENT STATES HER KNEES ARE SWOLLEN SO BAD THAT SHE CAN HARDLY STAND UP. SHE IS ON XARELTO AND SHE CANNOT TAKE IBUPROFEN  SHE HAS BEEN TAKING THE MAX TYLENOL AND IT IS NOT HELPING AT ALL. SHE IS IN SO MUCH PAIN. SHE HAS TRIED ALTERNATING HEAT AND ICE. SHE STATES SHE HAS A PRETTY HIGH PAIN TOLERANCE   PLEASE ADVISE HER ON WHAT SHE CAN DO FOR SOME RELIEF FOR THE NEXT FEW WEEKS BEFORE SHE GOES TO ORTHO.    THE PHARMACY SHOP [facility] [address] MEDINA CALL BACK [phone]

## 2020-03-25 NOTE — TELEPHONE ENCOUNTER
It looks like she is on my chart, so once we get the video visit scheduled, we will need to do this first, and then we can send in something. If she wants to check [facility] for the video option later today or tomorrow  (We had referred her to orthopedics in January, so not sure why she waited so long)

## 2020-03-30 ENCOUNTER — TELEMEDICINE (OUTPATIENT)
Dept: INTERNAL MEDICINE | Facility: CLINIC | Age: 45
End: 2020-03-30

## 2020-03-30 VITALS — OXYGEN SATURATION: 98 % | HEART RATE: 90 BPM

## 2020-03-30 DIAGNOSIS — R26.81 GAIT INSTABILITY: ICD-10-CM

## 2020-03-30 DIAGNOSIS — G89.29 CHRONIC PAIN OF RIGHT KNEE: ICD-10-CM

## 2020-03-30 DIAGNOSIS — M79.10 MUSCLE PAIN: ICD-10-CM

## 2020-03-30 DIAGNOSIS — I26.99 BILATERAL PULMONARY EMBOLISM (HCC): Primary | ICD-10-CM

## 2020-03-30 DIAGNOSIS — M25.561 CHRONIC PAIN OF RIGHT KNEE: ICD-10-CM

## 2020-03-30 PROCEDURE — 99214 OFFICE O/P EST MOD 30 MIN: CPT | Performed by: NURSE PRACTITIONER

## 2020-03-30 RX ORDER — METHOCARBAMOL 500 MG/1
500 TABLET, FILM COATED ORAL 4 TIMES DAILY
Qty: 120 TABLET | Refills: 0 | Status: SHIPPED | OUTPATIENT
Start: 2020-03-30 | End: 2021-03-11

## 2020-03-30 NOTE — PROGRESS NOTES
Subjective   Raine Wang is a 44 y.o. female.     This was an audio and video enabled telemedicine encounter.    Chief Complaint   Patient presents with   • Pulmonary Embolism       History of Present Illness   Raine is following up on bilateral pulmonary embolism.  She was hospitalized at [facility] 3/6/2020 due to bilateral pulmonary emboli.  At that time she had a left lower extremity duplex that was unable to be fully completed due to increased pain that was noted by Raine during exam.  The portions that were completed were negative for DVT.  She was started on Xarelto.  She has done well with this and denies any easy bleeding or bruising.  She followed up with asthma/allergy who does her infusions for her common variable immunodeficiency.  They have opted to hold off on any further infusions until she is seen by hematology.  She has an appointment to see Dr. Wilfredo Limon with hematology on 4/10/2020.      She still has occasional shortness of breath with exertion such as a long shower or walking her 2 large dogs.  She reports that she checks her oxygen saturations when she does feel short of breath and that they are okay.  She reports her oxygen saturations 98% today and heart rate is 90 and regular.      She is also complaining of some bilateral lower extremity pain.  Describes this as muscular and feels like she is slightly weak in her lower extremities.  She is not as mobile as she would like to be due to this increase in pain.  She does have fibromyalgia and has had multiple medication attempts in the past for this including Flexeril, Soma, Cymbalta, and Lyrica.  She did not do well with these historically.  Has allergies to Lyrica and Cymbalta as noted in allergy section of chart.        She has some chronic right knee pain as well.  She felt back in December and was advised by her PCP to see Ortho.  She has not seen them at this point.  She did do a telehealth visit on 3/25/2020 related to this  knee pain.  At that visit they advised her to use ice, take steroids, and sent in a prescription for a walker.  Raine did not take the steroids as she reports that steroids cause her to swell and become irritable and she has not obtained a walker either.  She is not using ice.  She has tried Tylenol and reports that Tylenol does nothing to help with her pain.  She is unable to take NSAIDs due to her PE/Xarelto.  She feels as though she would email her well suited to have a 3 or 4 pronged cane.      The following portions of the patient's history were reviewed and updated as appropriate: allergies, current medications, past family history, past medical history, past social history, past surgical history and problem list.    Review of Systems   Constitutional: Negative for appetite change, chills, diaphoresis, fatigue and unexpected weight change.   Eyes: Negative for visual disturbance.   Respiratory: Positive for shortness of breath. Negative for cough, chest tightness and wheezing.    Cardiovascular: Negative for chest pain, palpitations and leg swelling.   Gastrointestinal: Negative for constipation, diarrhea, nausea and vomiting.   Endocrine: Negative for polydipsia, polyphagia and polyuria.   Genitourinary: Negative for difficulty urinating and dysuria.   Musculoskeletal: Positive for arthralgias, gait problem and myalgias.   Skin: Negative for color change and rash.   Neurological: Negative for dizziness, syncope, weakness, light-headedness, numbness and headaches.   Psychiatric/Behavioral: Negative for sleep disturbance.           Outpatient Medications Marked as Taking for the 3/30/20 encounter (Telemedicine) with Yoko Pritchard APRN   Medication Sig Dispense Refill   • albuterol (PROVENTIL) (2.5 MG/3ML) 0.083% nebulizer solution Take 2.5 mg by nebulization Every 4 (Four) Hours As Needed for Wheezing. 25 vial 1   • ALBUTEROL SULFATE  (90 Base) MCG/ACT inhaler INHALE 2 PUFF BY MOUTH EVERY 4 HOURS  AS NEEDED FOR WHEEZING OR SHORTNESS OF AIR. 18 g 0   • ALPRAZolam (XANAX) 1 MG tablet Take 1 mg by mouth 2 (Two) Times a Day As Needed. 1/2 prn     • fexofenadine (ALLEGRA) 180 MG tablet Take 180 mg by mouth daily.     • HIZENTRA 2 GM/10ML solution      • Misc. Devices (GETGO ROLLING WALKER) misc Please fit and dispense one rolling walker covered by insurance  DX:  Gait disorder, bilat lower extremity swelling and bilat knee pain, H/O Fall  Becca R. MARIA ALEJANDRA 1 each 0   • SUDOGEST 12 HOUR 120 MG 12 hr tablet TAKE (1) TABLET BY MOUTH EVERY 12 HOURS. 60 tablet 10   • triamterene-hydrochlorothiazide (Maxzide-25) 37.5-25 MG per tablet 1 qd as needed for swelling 30 tablet 0           Objective   Physical Exam   Constitutional: She appears well-developed and well-nourished.   HENT:   Head: Normocephalic.   Eyes: Right pupil is round. Left pupil is round.   Cardiovascular: Normal rate and regular rhythm.   Rate and rhythm per patient report-patient has pulse ox at home.  Nonpitting bilateral lower extremity noted to feet and ankles   Pulmonary/Chest: No accessory muscle usage. No respiratory distress.   Musculoskeletal:        Right knee: She exhibits swelling. She exhibits normal range of motion, no effusion, no ecchymosis, no deformity and no erythema.   Crepitus with range of motion.  Tenderness on patella.    Skin: No rash noted. No erythema.   Psychiatric: She has a normal mood and affect. Her speech is normal and behavior is normal. Judgment and thought content normal. Cognition and memory are normal. She is attentive.       Vitals:    03/30/20 0803   Pulse: 90  Comment: per pt own home reading   Temp: Comment: denies fever   SpO2: 98%  Comment: per pt own home reading     There is no height or weight on file to calculate BMI.        Assessment/Plan   Raine was seen today for pulmonary embolism.    Diagnoses and all orders for this visit:    Bilateral pulmonary embolism (CMS/HCC)  -     rivaroxaban (Xarelto) 20 MG  tablet; Take 1 tablet by mouth Daily.  Continue Xarelto.  Keep appointment with hematology-she will call their office and see what her options for visit are given the COVID19 pandemic   chronic pain of right knee  -     Cane  Likely related osteoarthritis.  She cannot take NSAIDs due to PE/Xarelto.  Has tried Tylenol previously and that has not been effective.  Encouraged her to ice the knee    Gait instability  -     Cane  She previously had a prescription for a walker but she feels like she does not need this but that she would benefit more from a cane.  Prescription sent for cane.  I will reach out to our referral coordinator and  to see how we can get the cane to the patient due to social distancing related to pandemic.    Muscle pain  -     methocarbamol (Robaxin) 500 MG tablet; Take 1 tablet by mouth 4 (Four) Times a Day.    Methocarbamol as directed up to 4 times a day.  Adverse effects discussed.         Return in about 4 weeks (around 4/27/2020) for with Dr. Blanton after seen by hematology 4/10/2020.    I discussed my findings,recommendations, and plan of care was with the patient. They verbalized understanding and agreement.  Patient was encouraged to keep me informed of any acute changes, lack of improvement, or any new concerning symptoms.       * Please note that portions of this note were completed with a voice recognition program. Efforts were made to edit the dictation but occasionally words are erroneously transcribed.

## 2020-03-30 NOTE — PATIENT INSTRUCTIONS
Arthritis  Arthritis means joint pain. It can also mean joint disease. A joint is a place where bones come together. There are more than 100 types of arthritis.  What are the causes?  This condition may be caused by:  · Wear and tear of a joint. This is the most common cause.  · A lot of acid in the blood, which leads to pain in the joint (gout).  · Pain and swelling (inflammation) in a joint.  · Infection of a joint.  · Injuries in the joint.  · A reaction to medicines (allergy).  In some cases, the cause may not be known.  What are the signs or symptoms?  Symptoms of this condition include:  · Redness at a joint.  · Swelling at a joint.  · Stiffness at a joint.  · Warmth coming from the joint.  · A fever.  · A feeling of being sick.  How is this treated?  This condition may be treated with:  · Treating the cause, if it is known.  · Rest.  · Raising (elevating) the joint.  · Putting cold or hot packs on the joint.  · Medicines to treat symptoms and reduce pain and swelling.  · Shots of medicines (cortisone) into the joint.  You may also be told to make changes in your life, such as doing exercises and losing weight.  Follow these instructions at home:  Medicines  · Take over-the-counter and prescription medicines only as told by your doctor.  · Do not take aspirin for pain if your doctor says that you may have gout.  Activity  · Rest your joint if your doctor tells you to.  · Avoid activities that make the pain worse.  · Exercise your joint regularly as told by your doctor. Try doing exercises like:  ? Swimming.  ? Water aerobics.  ? Biking.  ? Walking.  Managing pain, stiffness, and swelling         · If told, put ice on the affected area.  ? Put ice in a plastic bag.  ? Place a towel between your skin and the bag.  ? Leave the ice on for 20 minutes, 2-3 times per day.  · If your joint is swollen, raise (elevate) it above the level of your heart if told by your doctor.  · If your joint feels stiff in the morning,  try taking a warm shower.  · If told, put heat on the affected area. Do this as often as told by your doctor. Use the heat source that your doctor recommends, such as a moist heat pack or a heating pad. If you have diabetes, do not apply heat without asking your doctor. To apply heat:  ? Place a towel between your skin and the heat source.  ? Leave the heat on for 20-30 minutes.  ? Remove the heat if your skin turns bright red. This is very important if you are unable to feel pain, heat, or cold. You may have a greater risk of getting burned.  General instructions  · Do not use any products that contain nicotine or tobacco, such as cigarettes, e-cigarettes, and chewing tobacco. If you need help quitting, ask your doctor.  · Keep all follow-up visits as told by your doctor. This is important.  Contact a doctor if:  · The pain gets worse.  · You have a fever.  Get help right away if:  · You have very bad pain in your joint.  · You have swelling in your joint.  · Your joint is red.  · Many joints become painful and swollen.  · You have very bad back pain.  · Your leg is very weak.  · You cannot control your pee (urine) or poop (stool).  Summary  · Arthritis means joint pain. It can also mean joint disease. A joint is a place where bones come together.  · The most common cause of this condition is wear and tear of a joint.  · Symptoms of this condition include redness, swelling, or stiffness of the joint.  · This condition is treated with rest, raising the joint, medicines, and putting cold or hot packs on the joint.  · Follow your doctor's instructions about medicines, activity, exercises, and other home care treatments.  This information is not intended to replace advice given to you by your health care provider. Make sure you discuss any questions you have with your health care provider.  Document Released: 03/14/2011 Document Revised: 11/25/2019 Document Reviewed: 11/25/2019  [employer] Interactive Patient Education ©  2020 [employer]

## 2020-04-13 RX ORDER — TRIAMTERENE AND HYDROCHLOROTHIAZIDE 37.5; 25 MG/1; MG/1
TABLET ORAL
Qty: 30 TABLET | Refills: 0 | Status: SHIPPED | OUTPATIENT
Start: 2020-04-13 | End: 2020-05-29 | Stop reason: SDUPTHER

## 2020-04-27 ENCOUNTER — TELEMEDICINE (OUTPATIENT)
Dept: INTERNAL MEDICINE | Facility: CLINIC | Age: 45
End: 2020-04-27

## 2020-04-27 DIAGNOSIS — Z79.01 CHRONIC ANTICOAGULATION: ICD-10-CM

## 2020-04-27 DIAGNOSIS — R19.7 DIARRHEA IN ADULT PATIENT: ICD-10-CM

## 2020-04-27 DIAGNOSIS — I26.99 BILATERAL PULMONARY EMBOLISM (HCC): Primary | ICD-10-CM

## 2020-04-27 DIAGNOSIS — R10.13 EPIGASTRIC DISCOMFORT: ICD-10-CM

## 2020-04-27 PROCEDURE — 99214 OFFICE O/P EST MOD 30 MIN: CPT | Performed by: NURSE PRACTITIONER

## 2020-04-27 NOTE — PROGRESS NOTES
This was an audio and video enabled telemedicine encounter.    Subjective   Raine Wang is a 44 y.o. female.     Chief Complaint   Patient presents with   • Pulmonary Embolism       History of Present Illness     Patient following up for recent pulmonary embolism-she was hospitalized at [facility] on 3/6/2020 due to bilateral pulmonary emboli.  At that time she had a left lower extremity duplex is unable to be fully completed due to increased pain that was noted by the patient during the exam.  The portions that were able to be completed were however negative for DVT.  She was started on Xarelto secondary to the pulmonary embolism and she has done well with this.  She is not having any easy bleeding or bruising.  She does have immunodeficiency and has been following with asthma/allergy who prescribes ig infusions for the CVID.    She has now transitioned over to seeing a Dr. Jared Stephenson at  immunology since I last saw her.  She has had a couple of telephone visits with him in the last couple of weeks.  She restarted her IgG infusions yesterday.  Reports that she was doing 32 g weekly and now is down to 14 g weekly and that Dr. Stephenson feels as though the high dose she was receiving from her previous provider was likely the cause of her pulmonary embolism.  She just restarted her infusions yesterday and has been doing well with it.    At a previous visit I referred her to see hematology oncology to aid in evaluating because of her PE as well as the duration of her anticoagulation.  She has not been able to see them due to the COVID-19 pandemic and reports that they did not want to do a telehealth visit with her.  She is not sure that she wants to see hematology oncology at this time and states that Dr. Stephenson does not feel that she needs to and that her PCP will need to make decision on the duration of her anticoagulation.  She has not had   Hypercoagulable work-up completed yet.    Shortness of  breath and fatigue are significantly improving.  She has been able to now do her own laundry which is a significant improvement.  Her laundry room is located downstairs and she was able to take 4 loads of laundry up and down the steps without becoming symptomatic.  Lower extremity swelling.  Is significantly improving.  She reports she has some occasional swelling to her left foot and is using her diuretic on a as needed basis and responds well to it.  Knee pain from last visit is resolved    Patient reports that she recently had a sinus infection was placed on antibiotic therapy for this.  She began having diarrhea and epigastric discomfort shortly after starting the Augmentin and her allergist had her stop it and start a course of metronidazole for her GI complaints.  Patient has had a significant improvement in diarrhea and is tolerating p.o. intake.  She denies any sinus symptoms.  Epigastric pain-responding to Prilosec.  She was recently put on Flagyl for a GI infection per her immunologist-Kimmie.  Patient feels as though the diarrhea and abdominal pain are significantly improving and just wanted to let me know in case symptoms do worsen        The following portions of the patient's history were reviewed and updated as appropriate: allergies, current medications, past family history, past medical history, past social history, past surgical history and problem list.        Review of Systems   Constitutional: Negative for chills, diaphoresis, fatigue, fever and unexpected weight loss.   HENT: Negative.    Eyes: Negative for pain and visual disturbance.   Respiratory: Negative for cough, chest tightness and shortness of breath.    Cardiovascular: Positive for leg swelling ( Intermittently). Negative for chest pain and palpitations.   Gastrointestinal: Positive for abdominal pain and diarrhea. Negative for abdominal distention, blood in stool, constipation, nausea and vomiting.   Genitourinary: Negative.  Negative  for difficulty urinating.   Musculoskeletal: Negative.  Negative for arthralgias and myalgias.   Skin: Negative for color change and rash.   Neurological: Negative for dizziness, weakness, light-headedness, headache and confusion.   Hematological: Does not bruise/bleed easily.           Outpatient Medications Marked as Taking for the 4/27/20 encounter (Telemedicine) with Yoko PritchardJOSEPH   Medication Sig Dispense Refill   • albuterol (PROVENTIL) (2.5 MG/3ML) 0.083% nebulizer solution Take 2.5 mg by nebulization Every 4 (Four) Hours As Needed for Wheezing. 25 vial 1   • ALBUTEROL SULFATE  (90 Base) MCG/ACT inhaler INHALE 2 PUFF BY MOUTH EVERY 4 HOURS AS NEEDED FOR WHEEZING OR SHORTNESS OF AIR. 18 g 0   • ALPRAZolam (XANAX) 1 MG tablet Take 1 mg by mouth 2 (Two) Times a Day As Needed. 1/2 prn     • fexofenadine (ALLEGRA) 180 MG tablet Take 180 mg by mouth daily.     • HIZENTRA 2 GM/10ML solution      • methocarbamol (Robaxin) 500 MG tablet Take 1 tablet by mouth 4 (Four) Times a Day. 120 tablet 0   • rivaroxaban (Xarelto) 20 MG tablet Take 1 tablet by mouth Daily. 30 tablet 1   • SUDOGEST 12 HOUR 120 MG 12 hr tablet TAKE (1) TABLET BY MOUTH EVERY 12 HOURS. 60 tablet 10   • triamterene-hydrochlorothiazide (MAXZIDE-25) 37.5-25 MG per tablet TAKE 1 TABLET BY MOUTH EVERY DAY AS NEEDED FOR SWELLING 30 tablet 0           Objective   Physical Exam   Constitutional: She is oriented to person, place, and time. She appears well-developed and well-nourished. She is cooperative. She does not appear ill. No distress.   HENT:   Head: Normocephalic and atraumatic.   Right Ear: External ear normal.   Left Ear: External ear normal.   Nose: No nasal deformity.   Mouth/Throat: Mucous membranes are normal.   Eyes: Conjunctivae and lids are normal. Right eye exhibits normal extraocular motion. Left eye exhibits normal extraocular motion. Right pupil is round. Left pupil is round.   Neck: Normal range of motion. No tracheal  deviation present.   Cardiovascular: Normal rate and regular rhythm.   Pulmonary/Chest: Effort normal.   Abdominal: Soft. Normal appearance. There is tenderness (mild) in the epigastric area.   Neurological: She is alert and oriented to person, place, and time. She has normal strength.   Psychiatric: She has a normal mood and affect. Her speech is normal and behavior is normal. Judgment and thought content normal. Cognition and memory are normal. She is attentive.   Exam limited due to the video nature of the visit.  Portions of exam have been completed/verbalized by the patient at the direction of the provider.    Vitals:    04/27/20 1000   BP: Comment: Patient reports she does not have a way to check her vital s     There is no height or weight on file to calculate BMI.        Assessment/Plan   Raine was seen today for pulmonary embolism.    Diagnoses and all orders for this visit:    Bilateral pulmonary embolism (CMS/HCC)  -     Homocysteine; Future  -     Protein C & S Antigens; Future  -     Protime-INR; Future  -     Factor 5 Leiden; Future  -     CBC (No Diff); Future  -     Comprehensive Metabolic Panel; Future  PE was likely the result of IgG.  She has not had a hypercoagulable work-up yet and she is not sure that she is going to see hematology.  We will go ahead and get labs to complete this work-up.  Continue anticoagulation for a minimum of 3 to 6 months she can discuss with Dr. Blanton at her upcoming appointment in 3 months how long she would ultimately need to continue the anticoagulation after her PE.    Chronic anticoagulation  -     Homocysteine; Future  -     Protein C & S Antigens; Future  -     Protime-INR; Future  -     Factor 5 Leiden; Future  -     CBC (No Diff); Future  -     Comprehensive Metabolic Panel; Future  Labs faxed over to the [facility] on [address] in [place] at patient's request that she may get these completed prior to her next visit.      Diarrhea in adult  patient  Patient reports that diarrhea is improving significantly since she took the Flagyl.  She only had 2 episodes of loose stools yesterday.  She is already doing electrolyte replacement solutions.  She will let me know if diarrhea does not resolve in the next 24 to 72 hours.    Epigastric discomfort  Significantly improved since she started taking Prilosec.  Encouraged her to continue this and if symptoms were to worsen she should let me know immediately.          Return in about 3 months (around 7/27/2020).  Or sooner if symptoms worsen.    I discussed my findings,recommendations, and plan of care was with the patient. They verbalized understanding and agreement.  Patient was encouraged to keep me informed of any acute changes, lack of improvement, or any new concerning symptoms.       * Please note that portions of this note were completed with a voice recognition program. Efforts were made to edit the dictation but occasionally words are erroneously transcribed.

## 2020-05-29 DIAGNOSIS — I26.99 BILATERAL PULMONARY EMBOLISM (HCC): ICD-10-CM

## 2020-05-29 DIAGNOSIS — J06.9 ACUTE URI: ICD-10-CM

## 2020-05-29 RX ORDER — TRIAMTERENE AND HYDROCHLOROTHIAZIDE 37.5; 25 MG/1; MG/1
TABLET ORAL
Qty: 30 TABLET | Refills: 0 | OUTPATIENT
Start: 2020-05-29

## 2020-05-29 RX ORDER — ALBUTEROL SULFATE 90 UG/1
AEROSOL, METERED RESPIRATORY (INHALATION)
Qty: 18 G | Refills: 0 | OUTPATIENT
Start: 2020-05-29

## 2020-05-29 RX ORDER — RIVAROXABAN 20 MG/1
TABLET, FILM COATED ORAL
Qty: 30 TABLET | Refills: 0 | OUTPATIENT
Start: 2020-05-29

## 2020-05-29 RX ORDER — ALBUTEROL SULFATE 90 UG/1
2 AEROSOL, METERED RESPIRATORY (INHALATION) EVERY 4 HOURS PRN
Qty: 18 G | Refills: 11 | Status: SHIPPED | OUTPATIENT
Start: 2020-05-29 | End: 2021-07-22

## 2020-05-29 RX ORDER — TRIAMTERENE AND HYDROCHLOROTHIAZIDE 37.5; 25 MG/1; MG/1
TABLET ORAL
Qty: 30 TABLET | Refills: 0 | Status: SHIPPED | OUTPATIENT
Start: 2020-05-29 | End: 2021-03-11

## 2020-06-03 ENCOUNTER — TELEMEDICINE (OUTPATIENT)
Dept: INTERNAL MEDICINE | Facility: CLINIC | Age: 45
End: 2020-06-03

## 2020-06-03 VITALS — HEART RATE: 80 BPM

## 2020-06-03 DIAGNOSIS — M25.561 ACUTE PAIN OF RIGHT KNEE: ICD-10-CM

## 2020-06-03 DIAGNOSIS — R60.0 LOWER EXTREMITY EDEMA: ICD-10-CM

## 2020-06-03 DIAGNOSIS — M25.572 ACUTE LEFT ANKLE PAIN: Primary | ICD-10-CM

## 2020-06-03 PROCEDURE — 99213 OFFICE O/P EST LOW 20 MIN: CPT | Performed by: NURSE PRACTITIONER

## 2020-06-03 NOTE — PROGRESS NOTES
You have chosen to receive care through a telehealth visit.  Do you consent to use a video/audio connection for your medical care today? Yes  This was an audio and video enabled telemedicine encounter.    Subjective   Raine Wang is a 44 y.o. female.     Chief Complaint   Patient presents with   • Edema       History of Present Illness     pt was scheduled for in office evaluation as advised by her PCP but she called and changed to telehealth because patient was concerned about coming into the office related to COVID and her history of immunocompromise.    She is being evaluated today for some lower extremity edema.      She had a fall in December oif 2019 and has had some pain and swelling in her left andkle an her right knee since then. She saw dr zambrano at that time and was advised to see ortho, but she never went due to COVID.     She is complaining of some ongoing edema to bilateral lower extremities. She recently had pulmonary embolism and is currently anticoagulated.  She reports that she is breathing and feeling better than she has in a long time.  She was able to get out and go hiking this past week without shortness of breath.  She does report that the swelling in her left ankle and right knee got worse after her hiking.  She has tried compression stockings and even increase to a higher grade compression without relief of the edema.  She is also currently on diuretic but does not feel as though this has improved the swelling.      The following portions of the patient's history were reviewed and updated as appropriate: allergies, current medications, past family history, past medical history, past social history, past surgical history and problem list.        Review of Systems   Constitutional: Negative for fatigue, fever and unexpected weight loss.   HENT: Negative.    Eyes: Negative for pain and visual disturbance.   Respiratory: Negative for cough, chest tightness, shortness of breath and wheezing.     Cardiovascular: Positive for leg swelling. Negative for chest pain and palpitations.   Gastrointestinal: Negative for abdominal pain, constipation and diarrhea.   Genitourinary: Negative.  Negative for difficulty urinating.   Musculoskeletal: Negative.  Negative for arthralgias and myalgias.   Skin: Negative for color change and rash.   Neurological: Negative for dizziness, weakness, light-headedness, headache and confusion.   Hematological: Does not bruise/bleed easily.           Outpatient Medications Marked as Taking for the 6/3/20 encounter (Telemedicine) with Yoko Pritchard APRN   Medication Sig Dispense Refill   • albuterol (PROVENTIL) (2.5 MG/3ML) 0.083% nebulizer solution Take 2.5 mg by nebulization Every 4 (Four) Hours As Needed for Wheezing. 25 vial 1   • albuterol sulfate  (90 Base) MCG/ACT inhaler Inhale 2 puffs Every 4 (Four) Hours As Needed for Wheezing. 18 g 11   • ALPRAZolam (XANAX) 1 MG tablet Take 1 mg by mouth 2 (Two) Times a Day As Needed. 1/2 prn     • fexofenadine (ALLEGRA) 180 MG tablet Take 180 mg by mouth daily.     • HIZENTRA 2 GM/10ML solution      • methocarbamol (Robaxin) 500 MG tablet Take 1 tablet by mouth 4 (Four) Times a Day. 120 tablet 0   • Misc. Devices (GETGO ROLLING WALKER) misc Please fit and dispense one rolling walker covered by insurance  DX:  Gait disorder, bilat lower extremity swelling and bilat knee pain, H/O Fall  Becca R. W 1 each 0   • rivaroxaban (Xarelto) 20 MG tablet Take 1 tablet by mouth Daily. 30 tablet 0   • SUDOGEST 12 HOUR 120 MG 12 hr tablet TAKE (1) TABLET BY MOUTH EVERY 12 HOURS. 60 tablet 10   • triamterene-hydrochlorothiazide (MAXZIDE-25) 37.5-25 MG per tablet TAKE 1 TABLET BY MOUTH EVERY DAY AS NEEDED FOR SWELLING 30 tablet 0           Objective   Physical Exam   Constitutional: She appears well-developed and well-nourished.   HENT:   Head: Normocephalic and atraumatic.   Eyes: EOM are normal. Right eye exhibits no discharge. Left eye  exhibits no discharge.   Neck: Neck normal appearance.  Cardiovascular:   No conjunctival pallor noted.    Pulmonary/Chest: Effort normal.   Abdominal: Soft.   Musculoskeletal:         General: Edema ( mild non pitting edema to bilateral LE.  Edema is slightly worse at the left ankle but is still nonpitting.) present.   Neurological: She is alert.   Skin: Skin is dry. Capillary refill takes 2 to 3 seconds. No rash noted. No nail bed cyanosis or erythema.   Psychiatric: She has a normal mood and affect.     Exam limited due to the video nature of the visit.  Patient does appear well and conversant during visit.    Vitals:    06/03/20 1457   BP: Comment: not able to check   Pulse: 80     There is no height or weight on file to calculate BMI.        Assessment/Plan   Raine was seen today for edema.    Diagnoses and all orders for this visit:    Acute left ankle pain  -     XR Ankle 3+ View Left; Future    Acute pain of right knee  -     XR Knee 1 or 2 View Right; Future    Lower extremity edema  -     BNP; Future  -     Basic Metabolic Panel; Future    Patient without any shortness of breath, chest pain, or palpitations.  She reports she actually feels better than she has in a long time and was able to go hiking this past week without difficulty.  She does have some bilateral lower extremity edema that is slightly worse in the left ankle region.  She does have a history of some trauma in this area.  Patient would like to proceed with x-rays of both areas as above.  We can start there and we will also check a BMP and a BNP to evaluate for any other causes of this edema.           Return in about 1 month (around 7/3/2020).    I discussed my findings,recommendations, and plan of care was with the patient. They verbalized understanding and agreement.  Patient was encouraged to keep me informed of any acute changes, lack of improvement, or any new concerning symptoms.       * Please note that portions of this note were  completed with a voice recognition program. Efforts were made to edit the dictation but occasionally words are erroneously transcribed.

## 2020-06-05 NOTE — TELEPHONE ENCOUNTER
Patient's  returned call.   Please call patient's  Kang to verify appointment at [phone]     Record request faxed to  medical record

## 2020-06-12 DIAGNOSIS — I26.99 BILATERAL PULMONARY EMBOLISM (HCC): ICD-10-CM

## 2020-06-12 LAB
ALBUMIN SERPL-MCNC: 4.3 G/DL (ref 3.8–4.8)
ALBUMIN/GLOB SERPL: 2 {RATIO} (ref 1.2–2.2)
ALP SERPL-CCNC: 81 IU/L (ref 39–117)
ALT SERPL-CCNC: 43 IU/L (ref 0–32)
AST SERPL-CCNC: 34 IU/L (ref 0–40)
BASOPHILS # BLD AUTO: 0.1 X10E3/UL (ref 0–0.2)
BASOPHILS NFR BLD AUTO: 1 %
BILIRUB SERPL-MCNC: 0.7 MG/DL (ref 0–1.2)
BUN SERPL-MCNC: 12 MG/DL (ref 6–24)
BUN/CREAT SERPL: 16 (ref 9–23)
CALCIUM SERPL-MCNC: 9.4 MG/DL (ref 8.7–10.2)
CHLORIDE SERPL-SCNC: 104 MMOL/L (ref 96–106)
CO2 SERPL-SCNC: 18 MMOL/L (ref 20–29)
CREAT SERPL-MCNC: 0.73 MG/DL (ref 0.57–1)
EOSINOPHIL # BLD AUTO: 0.2 X10E3/UL (ref 0–0.4)
EOSINOPHIL NFR BLD AUTO: 3 %
ERYTHROCYTE [DISTWIDTH] IN BLOOD BY AUTOMATED COUNT: 13.5 % (ref 11.7–15.4)
GLOBULIN SER CALC-MCNC: 2.2 G/DL (ref 1.5–4.5)
GLUCOSE SERPL-MCNC: 127 MG/DL (ref 65–99)
HCT VFR BLD AUTO: 48.1 % (ref 34–46.6)
HCYS SERPL-SCNC: 11.8 UMOL/L (ref 0–14.5)
HGB BLD-MCNC: 15.4 G/DL (ref 11.1–15.9)
IMM GRANULOCYTES # BLD AUTO: 0.2 X10E3/UL (ref 0–0.1)
IMM GRANULOCYTES NFR BLD AUTO: 2 %
LYMPHOCYTES # BLD AUTO: 2.1 X10E3/UL (ref 0.7–3.1)
LYMPHOCYTES NFR BLD AUTO: 27 %
MCH RBC QN AUTO: 29.5 PG (ref 26.6–33)
MCHC RBC AUTO-ENTMCNC: 32 G/DL (ref 31.5–35.7)
MCV RBC AUTO: 92 FL (ref 79–97)
MONOCYTES # BLD AUTO: 0.4 X10E3/UL (ref 0.1–0.9)
MONOCYTES NFR BLD AUTO: 5 %
NEUTROPHILS # BLD AUTO: 5 X10E3/UL (ref 1.4–7)
NEUTROPHILS NFR BLD AUTO: 62 %
PLATELET # BLD AUTO: 278 X10E3/UL (ref 150–450)
POTASSIUM SERPL-SCNC: 4.3 MMOL/L (ref 3.5–5.2)
PROT C AG ACT/NOR PPP IA: 123 % (ref 60–150)
PROT S AG ACT/NOR PPP IA: 108 % (ref 60–150)
PROT S FREE AG ACT/NOR PPP IA: 123 % (ref 57–157)
PROT SERPL-MCNC: 6.5 G/DL (ref 6–8.5)
RBC # BLD AUTO: 5.22 X10E6/UL (ref 3.77–5.28)
SODIUM SERPL-SCNC: 141 MMOL/L (ref 134–144)
WBC # BLD AUTO: 8.1 X10E3/UL (ref 3.4–10.8)

## 2020-06-15 ENCOUNTER — TELEPHONE (OUTPATIENT)
Dept: INTERNAL MEDICINE | Facility: CLINIC | Age: 45
End: 2020-06-15

## 2020-06-15 NOTE — TELEPHONE ENCOUNTER
PATIENT STATES SHE RECEIVED A NOTIFICATION FROM [facility] THAT THE LAB RESULTS WERE SENT TO PATIENT'S PCP'S OFFICE ON Friday, 6/12.  PLEASE STATES SHE WAS OUT OF RANGE WHERE SHE HAD NO SIGNAL EARLIER TODAY DID NOT KNOW IF ANYONE TRIED TO CALL HER BACK AND IS REQUESTING A CALL BACK CONCERNING LAB RESULTS.    PLEASE CALL PATIENT AT   [phone]

## 2020-06-16 DIAGNOSIS — I26.99 BILATERAL PULMONARY EMBOLISM (HCC): Primary | ICD-10-CM

## 2020-06-16 NOTE — TELEPHONE ENCOUNTER
Spoke with Raine and she will come by our office tomorrow morning and have the antithrombin, BNP, Factor 5 Leiden, Protime-INR, Diphtheria/Tetanus Panel, Pneumococcal antibody, IgE, IgG, IgA and IgM drawn. We checked with [facility] and they did not draw or process these labs when she went on the 10th to [facility] on [facility] to have them down. Emma our Phlebotomist in the office called over again and checked on those specific labs and they told her they did not do those. I apologized for this error and inconvenience of this and she was very understanding. She is glad we can get her in our office instead of her going back out to [facility].

## 2020-06-16 NOTE — TELEPHONE ENCOUNTER
Please review, pt called and was very upset that she received a letter from Dr. Crawford through my chart and was a little confused from the letter. She really wants Yoko Pritchard to explain in detail and would like a phone call back to give her the msg. Please advise, thanks. Just send back to clinical pool please

## 2020-06-16 NOTE — TELEPHONE ENCOUNTER
I do not have all the results yet. Have her disregard the letter from Dr. Crawford. She may need redraw of the missing labs . After we have all of them we can discuss. She still needs to see Dr. Blanton

## 2020-07-12 ENCOUNTER — TELEMEDICINE (OUTPATIENT)
Dept: FAMILY MEDICINE CLINIC | Facility: TELEHEALTH | Age: 45
End: 2020-07-12

## 2020-07-12 VITALS — WEIGHT: 293 LBS | HEIGHT: 65 IN | BODY MASS INDEX: 48.82 KG/M2 | TEMPERATURE: 99.3 F

## 2020-07-12 DIAGNOSIS — R30.0 DYSURIA: Primary | ICD-10-CM

## 2020-07-12 DIAGNOSIS — R39.89 SUSPECTED UTI: ICD-10-CM

## 2020-07-12 PROCEDURE — 99213 OFFICE O/P EST LOW 20 MIN: CPT | Performed by: NURSE PRACTITIONER

## 2020-07-12 RX ORDER — PHENAZOPYRIDINE HYDROCHLORIDE 100 MG/1
100 TABLET, FILM COATED ORAL 3 TIMES DAILY PRN
Qty: 14 TABLET | Refills: 0 | Status: SHIPPED | OUTPATIENT
Start: 2020-07-12 | End: 2021-03-11

## 2020-07-12 RX ORDER — NITROFURANTOIN 25; 75 MG/1; MG/1
100 CAPSULE ORAL 2 TIMES DAILY
Qty: 14 CAPSULE | Refills: 0 | Status: SHIPPED | OUTPATIENT
Start: 2020-07-12 | End: 2020-07-19

## 2020-07-12 NOTE — PROGRESS NOTES
CHIEF COMPLAINT  Chief Complaint   Patient presents with   • Difficulty Urinating     burning and requency of urination.          HPI  Raine Wang is a 44 y.o. female  presents with complaint of 3 days of burning with urination and one episode of urinary incontinence with urinary frequency and urgency. She has a low grade fever and with her immunosuppression she is concern she may have a UTI. She is familiar with the symptoms and is sure this is what she has. She denies back pain or CVA tenderness or pelvic pain.     Review of Systems   Constitutional: Positive for fever. Negative for activity change, appetite change and fatigue.   HENT: Negative.  Congestion: low grade fever.    Respiratory: Negative.    Cardiovascular: Negative.    Gastrointestinal: Negative.    Genitourinary: Positive for decreased urine volume, difficulty urinating, dysuria, enuresis (one episode), frequency and urgency. Negative for flank pain, hematuria, pelvic pain, vaginal bleeding, vaginal discharge and vaginal pain.   Musculoskeletal: Negative.  Negative for back pain.   Psychiatric/Behavioral: Negative.        Past Medical History:   Diagnosis Date   • ADHD (attention deficit hyperactivity disorder)    • Allergic    • Anxiety    • Asthma    • Deep vein thrombosis (CMS/HCC)    • Depression    • Fibromyalgia, primary    • History of placement of ear tubes    • Obesity    • Pap smear for cervical cancer screening 10/2011    [facility] at    • Patellar subluxation    • Pneumonia    • Pulmonary embolism (CMS/HCC) 03/05/2020    hospitalized at [facility]       Family History   Problem Relation Age of Onset   • Other Mother         common variable immune deficiecny    • Polycythemia Mother    • Asthma Mother    • Diabetes type II Father    • Asthma Brother    • Thyroid disease Other    • Asthma Maternal Aunt    • Asthma Brother        Social History     Socioeconomic History   • Marital status: Single     Spouse name: Not on file   •  "Number of children: Not on file   • Years of education: Not on file   • Highest education level: Not on file   Tobacco Use   • Smoking status: Former Smoker     Packs/day: 0.20     Years: 10.00     Pack years: 2.00     Types: Cigarettes     Start date: 1994     Last attempt to quit: 2014     Years since quittin.5   • Smokeless tobacco: Never Used   Substance and Sexual Activity   • Alcohol use: Yes     Alcohol/week: 1.0 standard drinks     Types: 1 Cans of beer per week     Comment: rare   • Drug use: No   • Sexual activity: Not Currently     Partners: Male     Birth control/protection: Abstinence         Temp 99.3 °F (37.4 °C)   Ht 165.1 cm (65\")   Wt (!) 174 kg (383 lb)   BMI 63.73 kg/m²     PHYSICAL EXAM  Physical Exam   Constitutional: She is oriented to person, place, and time. She appears well-developed and well-nourished. She does not have a sickly appearance. She does not appear ill. No distress. She appears obese.  HENT:   Head: Normocephalic and atraumatic.   Pulmonary/Chest: Effort normal.   Abdominal: There is no tenderness.   Neurological: She is alert and oriented to person, place, and time.   Psychiatric: She has a normal mood and affect.   Vitals reviewed.      Results for orders placed or performed in visit on 06/10/20   Comprehensive Metabolic Panel   Result Value Ref Range    Glucose 127 (H) 65 - 99 mg/dL    BUN 12 6 - 24 mg/dL    Creatinine 0.73 0.57 - 1.00 mg/dL    eGFR Non African Am 101 >59 mL/min/1.73    eGFR African Am 117 >59 mL/min/1.73    BUN/Creatinine Ratio 16 9 - 23    Sodium 141 134 - 144 mmol/L    Potassium 4.3 3.5 - 5.2 mmol/L    Chloride 104 96 - 106 mmol/L    Total CO2 18 (L) 20 - 29 mmol/L    Calcium 9.4 8.7 - 10.2 mg/dL    Total Protein 6.5 6.0 - 8.5 g/dL    Albumin 4.3 3.8 - 4.8 g/dL    Globulin 2.2 1.5 - 4.5 g/dL    A/G Ratio 2.0 1.2 - 2.2    Total Bilirubin 0.7 0.0 - 1.2 mg/dL    Alkaline Phosphatase 81 39 - 117 IU/L    AST (SGOT) 34 0 - 40 IU/L    ALT (SGPT) " 43 (H) 0 - 32 IU/L   Protein S, Total & Free   Result Value Ref Range    Protein S Ag, Total 108 60 - 150 %    Protein S Ag, Free 123 57 - 157 %   Homocysteine   Result Value Ref Range    Homocystine, Plasma (Quant) 11.8 0.0 - 14.5 umol/L   Protein C Antigen, Total   Result Value Ref Range    Protein C Antigen 123 60 - 150 %   CBC & Differential   Result Value Ref Range    WBC 8.1 3.4 - 10.8 x10E3/uL    RBC 5.22 3.77 - 5.28 x10E6/uL    Hemoglobin 15.4 11.1 - 15.9 g/dL    Hematocrit 48.1 (H) 34.0 - 46.6 %    MCV 92 79 - 97 fL    MCH 29.5 26.6 - 33.0 pg    MCHC 32.0 31.5 - 35.7 g/dL    RDW 13.5 11.7 - 15.4 %    Platelets 278 150 - 450 x10E3/uL    Neutrophil Rel % 62 Not Estab. %    Lymphocyte Rel % 27 Not Estab. %    Monocyte Rel % 5 Not Estab. %    Eosinophil Rel % 3 Not Estab. %    Basophil Rel % 1 Not Estab. %    Neutrophils Absolute 5.0 1.4 - 7.0 x10E3/uL    Lymphocytes Absolute 2.1 0.7 - 3.1 x10E3/uL    Monocytes Absolute 0.4 0.1 - 0.9 x10E3/uL    Eosinophils Absolute 0.2 0.0 - 0.4 x10E3/uL    Basophils Absolute 0.1 0.0 - 0.2 x10E3/uL    Immature Granulocyte Rel % 2 Not Estab. %    Immature Grans Absolute 0.2 (H) 0.0 - 0.1 x10E3/uL       Raine was seen today for difficulty urinating.    Diagnoses and all orders for this visit:    Dysuria  -     phenazopyridine (Pyridium) 100 MG tablet; Take 1 tablet by mouth 3 (Three) Times a Day As Needed for Bladder Spasms.    Suspected UTI  -     nitrofurantoin, macrocrystal-monohydrate, (Macrobid) 100 MG capsule; Take 1 capsule by mouth 2 (Two) Times a Day for 7 days.    increase water intake to 6-8 glasses per day and decrease caffeine     if at any time, experiences fever AND/OR worsening cough AND/OR shortness of breath, has been advised to go to nearest urgent care or emergency department for evaluation AND/OR testing    If no improvement, but not worsening, may schedule a f/u virtual visit or E-visit      FOLLOW-Up    with PCP/Virtual Care if no improvement or Urgent  Care/Emergency Department if worsening of symptoms    Patient verbalizes understanding of medication dosage, comfort measures, instructions for treatment and follow-up.    JOSEPH Bravo  07/12/2020  3:10 PM    This visit was performed via Telehealth.  This patient has been instructed to follow-up with their primary care provider if their symptoms worsen or the treatment provided does not resolve their illness.

## 2020-07-12 NOTE — PATIENT INSTRUCTIONS
Urinary Tract Infection, Adult    A urinary tract infection (UTI) is an infection of any part of the urinary tract. The urinary tract includes the kidneys, ureters, bladder, and urethra. These organs make, store, and get rid of urine in the body.  Your health care provider may use other names to describe the infection. An upper UTI affects the ureters and kidneys (pyelonephritis). A lower UTI affects the bladder (cystitis) and urethra (urethritis).  What are the causes?  Most urinary tract infections are caused by bacteria in your genital area, around the entrance to your urinary tract (urethra). These bacteria grow and cause inflammation of your urinary tract.  What increases the risk?  You are more likely to develop this condition if:  · You have a urinary catheter that stays in place (indwelling).  · You are not able to control when you urinate or have a bowel movement (you have incontinence).  · You are female and you:  ? Use a spermicide or diaphragm for birth control.  ? Have low estrogen levels.  ? Are pregnant.  · You have certain genes that increase your risk (genetics).  · You are sexually active.  · You take antibiotic medicines.  · You have a condition that causes your flow of urine to slow down, such as:  ? An enlarged prostate, if you are male.  ? Blockage in your urethra (stricture).  ? A kidney stone.  ? A nerve condition that affects your bladder control (neurogenic bladder).  ? Not getting enough to drink, or not urinating often.  · You have certain medical conditions, such as:  ? Diabetes.  ? A weak disease-fighting system (immunesystem).  ? Sickle cell disease.  ? Gout.  ? Spinal cord injury.  What are the signs or symptoms?  Symptoms of this condition include:  · Needing to urinate right away (urgently).  · Frequent urination or passing small amounts of urine frequently.  · Pain or burning with urination.  · Blood in the urine.  · Urine that smells bad or unusual.  · Trouble  urinating.  · Cloudy urine.  · Vaginal discharge, if you are female.  · Pain in the abdomen or the lower back.  You may also have:  · Vomiting or a decreased appetite.  · Confusion.  · Irritability or tiredness.  · A fever.  · Diarrhea.  The first symptom in older adults may be confusion. In some cases, they may not have any symptoms until the infection has worsened.  How is this diagnosed?  This condition is diagnosed based on your medical history and a physical exam. You may also have other tests, including:  · Urine tests.  · Blood tests.  · Tests for sexually transmitted infections (STIs).  If you have had more than one UTI, a cystoscopy or imaging studies may be done to determine the cause of the infections.  How is this treated?  Treatment for this condition includes:  · Antibiotic medicine.  · Over-the-counter medicines to treat discomfort.  · Drinking enough water to stay hydrated.  If you have frequent infections or have other conditions such as a kidney stone, you may need to see a health care provider who specializes in the urinary tract (urologist).  In rare cases, urinary tract infections can cause sepsis. Sepsis is a life-threatening condition that occurs when the body responds to an infection. Sepsis is treated in the hospital with IV antibiotics, fluids, and other medicines.  Follow these instructions at home:    Medicines  · Take over-the-counter and prescription medicines only as told by your health care provider.  · If you were prescribed an antibiotic medicine, take it as told by your health care provider. Do not stop using the antibiotic even if you start to feel better.  General instructions  · Make sure you:  ? Empty your bladder often and completely. Do not hold urine for long periods of time.  ? Empty your bladder after sex.  ? Wipe from front to back after a bowel movement if you are female. Use each tissue one time when you wipe.  · Drink enough fluid to keep your urine pale  yellow.  · Keep all follow-up visits as told by your health care provider. This is important.  Contact a health care provider if:  · Your symptoms do not get better after 1-2 days.  · Your symptoms go away and then return.  Get help right away if you have:  · Severe pain in your back or your lower abdomen.  · A fever.  · Nausea or vomiting.  Summary  · A urinary tract infection (UTI) is an infection of any part of the urinary tract, which includes the kidneys, ureters, bladder, and urethra.  · Most urinary tract infections are caused by bacteria in your genital area, around the entrance to your urinary tract (urethra).  · Treatment for this condition often includes antibiotic medicines.  · If you were prescribed an antibiotic medicine, take it as told by your health care provider. Do not stop using the antibiotic even if you start to feel better.  · Keep all follow-up visits as told by your health care provider. This is important.  This information is not intended to replace advice given to you by your health care provider. Make sure you discuss any questions you have with your health care provider.  Document Released: 09/27/2006 Document Revised: 12/05/2019 Document Reviewed: 06/27/2019  [employer] Patient Education © 2020 [employer]

## 2020-07-13 NOTE — TELEPHONE ENCOUNTER
Pt requesting refill of xarelto and maxzide 37.5-25mg. Has not had appt in office since 3/11/20. Ok for refills if pt makes follow up appt? Last labs completed on 6/11/20  Please advise, Thanks.

## 2020-07-13 NOTE — TELEPHONE ENCOUNTER
Ok to just send in 30 days w/ no RF, and have her schedule a f/u w/ me. She was a no-show to last appt  thanks

## 2020-07-16 NOTE — TELEPHONE ENCOUNTER
Attempted to contact pt to schedule follow up appt. No answer. LVM to return call with office number given.

## 2021-03-11 ENCOUNTER — TELEMEDICINE (OUTPATIENT)
Dept: FAMILY MEDICINE CLINIC | Facility: TELEHEALTH | Age: 46
End: 2021-03-11

## 2021-03-11 DIAGNOSIS — H10.31 ACUTE BACTERIAL CONJUNCTIVITIS OF RIGHT EYE: Primary | ICD-10-CM

## 2021-03-11 PROCEDURE — 99213 OFFICE O/P EST LOW 20 MIN: CPT | Performed by: NURSE PRACTITIONER

## 2021-03-11 RX ORDER — NAPROXEN 500 MG/1
500 TABLET ORAL EVERY 12 HOURS PRN
COMMUNITY
Start: 2021-01-21 | End: 2021-03-16 | Stop reason: ALTCHOICE

## 2021-03-11 RX ORDER — TOBRAMYCIN 3 MG/ML
1 SOLUTION/ DROPS OPHTHALMIC EVERY 6 HOURS SCHEDULED
Qty: 5 ML | Refills: 0 | Status: SHIPPED | OUTPATIENT
Start: 2021-03-11 | End: 2021-03-16

## 2021-03-11 RX ORDER — ESZOPICLONE 2 MG/1
TABLET, FILM COATED ORAL
COMMUNITY
Start: 2021-01-08

## 2021-03-11 RX ORDER — LISDEXAMFETAMINE DIMESYLATE 40 MG/1
CAPSULE ORAL
COMMUNITY
Start: 2021-02-17

## 2021-03-11 RX ORDER — FLUOXETINE 10 MG/1
CAPSULE ORAL
COMMUNITY
Start: 2020-12-04

## 2021-03-11 RX ORDER — PREDNISONE 20 MG/1
40 TABLET ORAL DAILY
COMMUNITY
Start: 2021-01-21 | End: 2021-03-16 | Stop reason: ALTCHOICE

## 2021-03-11 RX ORDER — ALPRAZOLAM 2 MG/1
TABLET ORAL
COMMUNITY
Start: 2021-02-17

## 2021-03-11 RX ORDER — VORTIOXETINE 20 MG/1
TABLET, FILM COATED ORAL
COMMUNITY
Start: 2021-01-20 | End: 2021-03-16 | Stop reason: ALTCHOICE

## 2021-03-11 NOTE — PROGRESS NOTES
"CHIEF COMPLAINT  No chief complaint on file.        [facility]  Raine Wang is a 45 y.o. female  presents with complaint of irritated right eye since yesterday.  Eye is itching and burning, \"feels like something is in the eye\", matted together yesterday and this morning.  Constantly watering    Review of Systems   Constitutional: Negative for activity change, appetite change and fatigue.   Eyes: Positive for discharge, redness and itching. Negative for photophobia and visual disturbance.   All other systems reviewed and are negative.      Past Medical History:   Diagnosis Date   • ADHD (attention deficit hyperactivity disorder)    • Allergic    • Anxiety    • Asthma    • Deep vein thrombosis (CMS/HCC)    • Depression    • Fibromyalgia, primary    • History of placement of ear tubes    • Obesity    • Pap smear for cervical cancer screening 10/2011    M[facility] Wong at    • Patellar subluxation    • Pneumonia    • Pulmonary embolism (CMS/HCC) 2020    hospitalized at [facility]       Family History   Problem Relation Age of Onset   • Other Mother         common variable immune deficiecny    • Polycythemia Mother    • Asthma Mother    • Diabetes type II Father    • Asthma Brother    • Thyroid disease Other    • Asthma Maternal Aunt    • Asthma Brother        Social History     Socioeconomic History   • Marital status: Single     Spouse name: Not on file   • Number of children: Not on file   • Years of education: Not on file   • Highest education level: Not on file   Tobacco Use   • Smoking status: Former Smoker     Packs/day: 0.20     Years: 10.00     Pack years: 2.00     Types: Cigarettes     Start date: 1994     Quit date: 2014     Years since quittin.1   • Smokeless tobacco: Never Used   Substance and Sexual Activity   • Alcohol use: Yes     Alcohol/week: 1.0 standard drinks     Types: 1 Cans of beer per week     Comment: rare   • Drug use: No   • Sexual activity: Not Currently     Partners: Male     " Birth control/protection: Abstinence         There were no vitals taken for this visit.    PHYSICAL EXAM  Physical Exam   Constitutional: She is oriented to person, place, and time. She appears well-developed and well-nourished. She does not have a sickly appearance. She does not appear ill.   HENT:   Head: Normocephalic and atraumatic.   Eyes: Right eye exhibits discharge and edema. Left eye exhibits no discharge and no edema. Right conjunctiva is injected. Left conjunctiva is not injected.   Neurological: She is alert and oriented to person, place, and time.         Diagnoses and all orders for this visit:    1. Acute bacterial conjunctivitis of right eye (Primary)  -     tobramycin (Tobrex) 0.3 % solution ophthalmic solution; Administer 1 drop to the right eye Every 6 (Six) Hours for 7 days.  Dispense: 5 mL; Refill: 0    --advised to use eye drops as prescribed; do not touch eye, if so, wash hands frequently    --advised to seek immediate care if change in vision, pain in eye, worsening symptoms.      FOLLOW-UP  As discussed during visit with PCP/[facility] if no improvement or Urgent Care/Emergency Department if worsening of symptoms    Patient verbalizes understanding of medication dosage, comfort measures, instructions for treatment and follow-up.    Joanna Garvin, JOSEPH  03/11/2021  12:28 EST    This visit was performed via Telehealth.  This patient has been instructed to follow-up with their primary care provider if their symptoms worsen or the treatment provided does not resolve their illness.

## 2021-03-16 ENCOUNTER — TELEMEDICINE (OUTPATIENT)
Dept: INTERNAL MEDICINE | Facility: CLINIC | Age: 46
End: 2021-03-16

## 2021-03-16 DIAGNOSIS — H10.33 ACUTE BACTERIAL CONJUNCTIVITIS OF BOTH EYES: Primary | ICD-10-CM

## 2021-03-16 PROCEDURE — 99213 OFFICE O/P EST LOW 20 MIN: CPT | Performed by: NURSE PRACTITIONER

## 2021-03-16 RX ORDER — NEOMYCIN/POLYMYXIN B/HYDROCORT 3.5-10K-1
1 SUSPENSION, DROPS(FINAL DOSAGE FORM)(ML) OPHTHALMIC (EYE)
Qty: 7.5 ML | Refills: 0 | Status: SHIPPED | OUTPATIENT
Start: 2021-03-16 | End: 2021-03-17 | Stop reason: RX

## 2021-03-16 NOTE — PROGRESS NOTES
You have chosen to receive care through a telehealth visit.  Do you consent to use a video/audio connection for your medical care today? Yes  This was an audio and video enabled telemedicine encounter.    Subjective   Raine Wang is a 45 y.o. female.     Chief Complaint   Patient presents with   • Conjunctivitis       Conjunctivitis   The current episode started more than 1 week ago. The onset was sudden. The problem occurs rarely. The problem is mild. Nothing relieves the symptoms. Nothing aggravates the symptoms. Associated symptoms include eye itching, eye pain and eye redness. Pertinent negatives include no fever, no decreased vision, no double vision, no photophobia, no abdominal pain, no congestion, no ear discharge and no eye discharge. The eye pain is mild. Both eyes are affected.The eye pain is not associated with movement. The eyelid exhibits redness. She has been eating and drinking normally. There were no sick contacts. Recently, medical care has been given at another facility (saw telehealth provider who Rx'd tobramycin op. no improvement in symptoms).          The following portions of the patient's history were reviewed and updated as appropriate: allergies, current medications, past family history, past medical history, past social history, past surgical history and problem list.        Review of Systems   Constitutional: Negative for fever.   HENT: Negative for congestion and ear discharge.    Eyes: Positive for pain, redness and itching. Negative for double vision, photophobia and discharge.   Gastrointestinal: Negative for abdominal pain.           Outpatient Medications Marked as Taking for the 3/16/21 encounter (Telemedicine) with Yoko Pritchard APRN   Medication Sig Dispense Refill   • albuterol (PROVENTIL) (2.5 MG/3ML) 0.083% nebulizer solution Take 2.5 mg by nebulization Every 4 (Four) Hours As Needed for Wheezing. 25 vial 1   • albuterol sulfate  (90 Base) MCG/ACT inhaler  Inhale 2 puffs Every 4 (Four) Hours As Needed for Wheezing. 18 g 11   • ALPRAZolam (XANAX) 2 MG tablet      • eszopiclone (LUNESTA) 2 MG tablet      • fexofenadine (ALLEGRA) 180 MG tablet Take 180 mg by mouth daily.     • FLUoxetine (PROzac) 10 MG capsule      • HIZENTRA 2 GM/10ML solution      • SUDOGEST 12 HOUR 120 MG 12 hr tablet TAKE (1) TABLET BY MOUTH EVERY 12 HOURS. 60 tablet 10   • Vyvanse 40 MG capsule        Allergies   Allergen Reactions   • Lyrica [Pregabalin] Other (See Comments)     Severe Hand and feet swelling   • Cymbalta [Duloxetine Hcl] Unknown (See Comments)     Years ago           Objective   Physical Exam   Constitutional: She appears well-developed and well-nourished.   HENT:   Head: Normocephalic and atraumatic.   Eyes: EOM and lids are normal. Right eye exhibits discharge. Right eye exhibits no edema. No foreign body present in the right eye. Left eye exhibits discharge. Left eye exhibits no edema. No foreign body present in the left eye. Right conjunctiva is injected. Right conjunctiva has no hemorrhage. Left conjunctiva is injected. Left conjunctiva has no hemorrhage. No scleral icterus. Right pupil is round. Left pupil is round. Pupils are equal.   Cardiovascular:   No conjunctival pallor noted.    Pulmonary/Chest: Effort normal.  No respiratory distress.  Abdominal: Abdomen appears normal.   Neurological: She is alert.   Skin: Skin is dry. No erythema. No pallor.   Psychiatric: She has a normal mood and affect.         There were no vitals filed for this visit.  There is no height or weight on file to calculate BMI.        Assessment/Plan   Diagnoses and all orders for this visit:    1. Acute bacterial conjunctivitis of both eyes (Primary)  -     neomycin-polymyxin-hydrocortisone (CORTISPORIN) 3.5-59676-1 ophthalmic suspension; Administer 1 drop to both eyes Every 4 (Four) Hours While Awake for 7 days.  Dispense: 7.5 mL; Refill: 0    Cortisporin ophthalmic drops as directed..  Follow-up  if symptoms fail to improve.  Follow-up if significant increase in eye pain, fever, or visual disturbances.             Return if symptoms worsen or fail to improve.    I have reviewed the limitations of a telehealth visit (such as lack of a physical exam and unable to obtain vital signs) and advised the patient that they may need to follow up for an office visit should their symptoms or concerns persist, worsen, or change.  Patient was encouraged to keep me informed of any acute changes, lack of improvement, or any new concerning symptoms.   I discussed my findings,recommendations, and plan of care was with the patient. They verbalized understanding and agreement.        * Please note that portions of this note were completed with a voice recognition program. Efforts were made to edit the dictation but occasionally words are erroneously transcribed.

## 2021-03-17 DIAGNOSIS — H10.33 ACUTE BACTERIAL CONJUNCTIVITIS OF BOTH EYES: Primary | ICD-10-CM

## 2021-03-17 RX ORDER — NEOMYCIN SULFATE, POLYMYXIN B SULFATE AND DEXAMETHASONE 3.5; 10000; 1 MG/ML; [USP'U]/ML; MG/ML
1 SUSPENSION/ DROPS OPHTHALMIC EVERY 6 HOURS SCHEDULED
Qty: 5 ML | Refills: 0 | Status: SHIPPED | OUTPATIENT
Start: 2021-03-17 | End: 2021-03-24

## 2021-04-06 ENCOUNTER — TELEPHONE (OUTPATIENT)
Dept: INTERNAL MEDICINE | Facility: CLINIC | Age: 46
End: 2021-04-06

## 2021-04-06 NOTE — TELEPHONE ENCOUNTER
Caller: Raine Wang    Relationship: Self    Best call back number: [phone]    Who is your current provider: DR VAISHALI JOSHI     Who would you like your new provider to be: WENDY RUIZ    What are your reasons for transferring care: FEELS LIKE SHE HAS A GOOD WORKING RELATIONSHIP WITH WENDY NIEVES- HAS SEEN JOSEPH NIEVES FOR HE LAST FEW TIMES

## 2021-04-07 ENCOUNTER — TELEMEDICINE (OUTPATIENT)
Dept: INTERNAL MEDICINE | Facility: CLINIC | Age: 46
End: 2021-04-07

## 2021-04-07 DIAGNOSIS — N39.0 ACUTE UTI: Primary | ICD-10-CM

## 2021-04-07 DIAGNOSIS — Z92.29 COVID-19 VACCINE SERIES COMPLETED: ICD-10-CM

## 2021-04-07 DIAGNOSIS — D83.9 CVID (COMMON VARIABLE IMMUNODEFICIENCY) (HCC): Chronic | ICD-10-CM

## 2021-04-07 PROCEDURE — 99214 OFFICE O/P EST MOD 30 MIN: CPT | Performed by: NURSE PRACTITIONER

## 2021-04-07 RX ORDER — NITROFURANTOIN 25; 75 MG/1; MG/1
100 CAPSULE ORAL 2 TIMES DAILY
Qty: 14 CAPSULE | Refills: 0 | Status: SHIPPED | OUTPATIENT
Start: 2021-04-07

## 2021-04-15 NOTE — TELEPHONE ENCOUNTER
Spoke with patient, she states that she feels she just has bonded better with Yoko as opposed to Dr. Blanton.    Ladies - any issues with this?

## 2021-04-16 ENCOUNTER — TELEPHONE (OUTPATIENT)
Dept: INTERNAL MEDICINE | Facility: CLINIC | Age: 46
End: 2021-04-16

## 2021-04-16 DIAGNOSIS — J06.9 ACUTE URI: Primary | ICD-10-CM

## 2021-04-16 RX ORDER — AMOXICILLIN AND CLAVULANATE POTASSIUM 875; 125 MG/1; MG/1
1 TABLET, FILM COATED ORAL 2 TIMES DAILY
Qty: 20 TABLET | Refills: 0 | Status: SHIPPED | OUTPATIENT
Start: 2021-04-16

## 2021-04-16 NOTE — TELEPHONE ENCOUNTER
S/W pt to who states she is not in respiratory distress.  She states all the congestion is in her head. She has had a COVID test today which was negative. Her T-max has been 101.1 but goes down to 100.3 after taking tylenol. She has not been tested for Flu.

## 2021-04-16 NOTE — TELEPHONE ENCOUNTER
PATIENT HAS HIGH FEVER, CONGESTED, CAN'T BREATH/STUFFED UP.  PATIENT THINKS SHE HAS A SINUS INFECTION AND FEELS SHE NEEDS AN ANTIBIOTIC. SHE DOESN'T WANT IT TO GO AND, WITH HER IMMUNE COMPROMISED POSITION, SHE WOULD RATHER DEAL WITH HER PRIMARY DOCTOR THAN AN URGENT CARE. COVID TESTING WAS NEGATIVE AND ANTIBODIES TESTING WAS POSITIVE.     PHARMACY:  [address]    PLEASE CALL [phone]

## 2021-04-16 NOTE — TELEPHONE ENCOUNTER
She has had similar in the past so I think we could go ahead and do augmentin. If she gets worse over the weekend should go to UC.

## 2021-04-21 ENCOUNTER — TELEPHONE (OUTPATIENT)
Dept: INTERNAL MEDICINE | Facility: CLINIC | Age: 46
End: 2021-04-21

## 2021-04-21 DIAGNOSIS — J06.9 ACUTE URI: Primary | ICD-10-CM

## 2021-04-21 RX ORDER — METHYLPREDNISOLONE 4 MG/1
TABLET ORAL
Qty: 21 TABLET | Refills: 0 | Status: SHIPPED | OUTPATIENT
Start: 2021-04-21

## 2021-04-21 RX ORDER — METHYLPREDNISOLONE 4 MG/1
TABLET ORAL
Qty: 21 TABLET | Refills: 0 | Status: SHIPPED | OUTPATIENT
Start: 2021-04-21 | End: 2021-04-21 | Stop reason: SDUPTHER

## 2021-04-21 NOTE — TELEPHONE ENCOUNTER
Pt was seen 4/7.  She is currently taking Augmentin.  She says that normally it takes a steroid also to get better.  Would like to know if it can be sent to the Pharmacy shop so it cqan be delivered today.  I tried to schedule her for a video appointment but there was none available today.

## 2021-04-21 NOTE — TELEPHONE ENCOUNTER
Pt requested med sent to Pharmacy shop instead of [facility] because they will deliver.  I will call to cancel the [facility] if we can resend to [facility].

## 2021-04-21 NOTE — TELEPHONE ENCOUNTER
Caller: Raine Wang    Relationship: Self    Best call back number: [phone]     What was the call regarding: PATIENT CALLED STATING THAT SHE IS STILL HAVING SINUS PRESSURE, CONGESTION, RUNNY NOSE.  PATIENT ASKED FOR A MYCHART VISIT WITH WENDY NIEVES BUT NOT APPOINTMENT WERE AVAILABLE.  PATIENT WOULD LIKE FOR A CALL BACK.    Do you require a callback: YES

## 2021-07-20 DIAGNOSIS — J06.9 ACUTE URI: ICD-10-CM

## 2021-07-22 RX ORDER — ALBUTEROL SULFATE 90 UG/1
2 AEROSOL, METERED RESPIRATORY (INHALATION) EVERY 4 HOURS PRN
Qty: 18 G | Refills: 0 | Status: SHIPPED | OUTPATIENT
Start: 2021-07-22 | End: 2021-09-29

## 2021-07-22 NOTE — TELEPHONE ENCOUNTER
Last Office Visit: 03/16/2021-telehealth  Next Office Visit:not scheduled    Labs completed in past 6 months? no  Labs completed in past year? no    Last Refill Date: 05/29/2020  Quantity:18  Refills:11    Pharmacy:   [facility]/pharmacy #[id number] - [place], [place] - [address]  [phone]  - [phone] FX   [address] [facility] [address]   Please review pended refill request for any changes needed on refills or quantities. Thank you!

## 2021-09-29 DIAGNOSIS — J06.9 ACUTE URI: ICD-10-CM

## 2021-09-29 RX ORDER — ALBUTEROL SULFATE 90 UG/1
2 AEROSOL, METERED RESPIRATORY (INHALATION) EVERY 4 HOURS PRN
Qty: 18 G | Refills: 0 | Status: SHIPPED | OUTPATIENT
Start: 2021-09-29

## 2021-09-29 NOTE — TELEPHONE ENCOUNTER
Last Office Visit: 03/16/2021  Next Office Visit: none     Labs completed in past 6 months? no  Labs completed in past year? no        Please review pended refill request for any changes needed on refills or quantities. Thank you!

## 2023-05-30 NOTE — PROGRESS NOTES
You have chosen to receive care through a telehealth visit.  Do you consent to use a video/audio connection for your medical care today? Yes  This was an audio and video enabled telemedicine encounter.    Subjective   Raine Wang is a 45 y.o. female.     Chief Complaint   Patient presents with   • Urinary Tract Infection       Urinary Tract Infection   This is a new problem. The current episode started in the past 7 days. The problem occurs every urination. Associated symptoms include chills, flank pain, frequency, nausea and urgency. Pertinent negatives include no hematuria or vomiting. Treatments tried: azo, and cranberry with increased fluids. The treatment provided no relief. Her past medical history is significant for recurrent UTIs.   Denies CVA tenderness but does have some mild flank pain.  She reports that she has had UTIs before.  She feels like that is definitely what this is based off of her typical symptoms.  She usually responds very well to Macrobid so she want to go ahead and get started.    She has common variable immunodeficiency.  She has done relatively well with this she gets IgG infusions regularly..  She has had both of her covid vaccinations and she would like to have antibody testing to make sure she did develop immunity to her CVID.     The following portions of the patient's history were reviewed and updated as appropriate: allergies, current medications, past family history, past medical history, past social history, past surgical history and problem list.        Review of Systems   Constitutional: Positive for chills.   Gastrointestinal: Positive for nausea. Negative for vomiting.   Genitourinary: Positive for dysuria, flank pain, frequency and urgency. Negative for hematuria.           Outpatient Medications Marked as Taking for the 4/7/21 encounter (Telemedicine) with Yoko Pritchard APRN   Medication Sig Dispense Refill   • albuterol (PROVENTIL) (2.5 MG/3ML) 0.083% nebulizer  solution Take 2.5 mg by nebulization Every 4 (Four) Hours As Needed for Wheezing. 25 vial 1   • albuterol sulfate  (90 Base) MCG/ACT inhaler Inhale 2 puffs Every 4 (Four) Hours As Needed for Wheezing. 18 g 11   • ALPRAZolam (XANAX) 2 MG tablet      • eszopiclone (LUNESTA) 2 MG tablet      • fexofenadine (ALLEGRA) 180 MG tablet Take 180 mg by mouth daily.     • FLUoxetine (PROzac) 10 MG capsule      • HIZENTRA 2 GM/10ML solution      • SUDOGEST 12 HOUR 120 MG 12 hr tablet TAKE (1) TABLET BY MOUTH EVERY 12 HOURS. 60 tablet 10   • Vyvanse 40 MG capsule        Allergies   Allergen Reactions   • Lyrica [Pregabalin] Other (See Comments)     Severe Hand and feet swelling   • Cymbalta [Duloxetine Hcl] Unknown (See Comments)     Years ago       Objective   Physical Exam   Constitutional: She appears well-developed and well-nourished. No distress.   HENT:   Head: Normocephalic and atraumatic.   Eyes: Conjunctivae are normal.   Neck: Neck normal appearance.  Pulmonary/Chest: Effort normal.  No respiratory distress.  Abdominal: Abdomen appears normal. She exhibits no distension. There is no abdominal tenderness.   Skin: Skin is warm and dry. She is not diaphoretic.   Psychiatric: She has a normal mood and affect.         There were no vitals filed for this visit.  There is no height or weight on file to calculate BMI.        Assessment/Plan   Diagnoses and all orders for this visit:    1. Acute UTI (Primary)  -     nitrofurantoin, macrocrystal-monohydrate, (Macrobid) 100 MG capsule; Take 1 capsule by mouth 2 (Two) Times a Day.  Dispense: 14 capsule; Refill: 0  Macrobid as directed.  If symptoms persists despite above antibiotic or worsen in the interim we will need to have her reevaluated and send a culture.    2. COVID-19 vaccine series completed/3. CVID (common variable immunodeficiency) (CMS/[facility])  -     SARS-CoV-2 Antibodies (Roche); Future  CVID symptoms fairly stable.  Managed with IgG infusions.  We will go ahead  and check her Covid antibody to ensure she got some immunity from her vaccination series with her CVID              Return if symptoms worsen or fail to improve.    I have reviewed the limitations of a telehealth visit (such as lack of a physical exam and unable to obtain vital signs) and advised the patient that they may need to follow up for an office visit should their symptoms or concerns persist, worsen, or change.  Patient was encouraged to keep me informed of any acute changes, lack of improvement, or any new concerning symptoms.   I discussed my findings,recommendations, and plan of care was with the patient. They verbalized understanding and agreement.        * Please note that portions of this note were completed with a voice recognition program. Efforts were made to edit the dictation but occasionally words are erroneously transcribed.    Quality 431: Preventive Care And Screening: Unhealthy Alcohol Use - Screening: Patient not identified as an unhealthy alcohol user when screened for unhealthy alcohol use using a systematic screening method Detail Level: Detailed Quality 226: Preventive Care And Screening: Tobacco Use: Screening And Cessation Intervention: Patient screened for tobacco use and is an ex/non-smoker

## (undated) DEVICE — "MH-438 A/W VLVE F/140 EVIS-140": Brand: AIR/WATER VALVE

## (undated) DEVICE — SINGLE-USE BIOPSY FORCEPS: Brand: RADIAL JAW 4

## (undated) DEVICE — CONTN GRAD MEAS TRIANG 32OZ BLK

## (undated) DEVICE — Device: Brand: ENDOGATOR

## (undated) DEVICE — INTRO ACCSR BLNT TP

## (undated) DEVICE — SYR LUERLOK 50ML

## (undated) DEVICE — "MH-443 SUCTION VALVE F/EVIS140 EVIS160": Brand: SUCTION VALVE

## (undated) DEVICE — TBG 02 CRUSH RESIST LF CLR 7FT

## (undated) DEVICE — THE BITE BLOCK MAXI, LATEX FREE STRAP IS USED TO PROTECT THE ENDOSCOPE INSERTION TUBE FROM BEING BITTEN BY THE PATIENT.

## (undated) DEVICE — "MH-948 A/W CHANNEL CLEANING ADPTR -VIDEO": Brand: AW CHANNEL CLEANING ADAPTE

## (undated) DEVICE — ENDOGATOR HYBRID TUBING KIT FOR USE WITH ENDOGATOR IRRIGATION PUMP, OLYMPUS PUMP, GI4000 ESU, AND TORRENT IRRIGATION PUMP.: Brand: ENDOGATOR KIT